# Patient Record
Sex: FEMALE | Race: WHITE
[De-identification: names, ages, dates, MRNs, and addresses within clinical notes are randomized per-mention and may not be internally consistent; named-entity substitution may affect disease eponyms.]

---

## 2020-08-05 ENCOUNTER — HOSPITAL ENCOUNTER (OUTPATIENT)
Dept: HOSPITAL 47 - LABPAT | Age: 65
Discharge: HOME | End: 2020-08-05
Attending: ORTHOPAEDIC SURGERY
Payer: MEDICARE

## 2020-08-05 DIAGNOSIS — Z01.818: Primary | ICD-10-CM

## 2020-08-05 DIAGNOSIS — Z01.812: ICD-10-CM

## 2020-08-05 LAB
ALBUMIN SERPL-MCNC: 4.1 G/DL (ref 3.5–5)
ALP SERPL-CCNC: 136 U/L (ref 38–126)
ALT SERPL-CCNC: 26 U/L (ref 4–34)
ANION GAP SERPL CALC-SCNC: 6 MMOL/L
APTT BLD: 24.1 SEC (ref 22–30)
AST SERPL-CCNC: 33 U/L (ref 14–36)
BUN SERPL-SCNC: 21 MG/DL (ref 7–17)
CALCIUM SPEC-MCNC: 9.7 MG/DL (ref 8.4–10.2)
CHLORIDE SERPL-SCNC: 107 MMOL/L (ref 98–107)
CO2 SERPL-SCNC: 28 MMOL/L (ref 22–30)
ERYTHROCYTE [DISTWIDTH] IN BLOOD BY AUTOMATED COUNT: 5.1 M/UL (ref 3.8–5.4)
ERYTHROCYTE [DISTWIDTH] IN BLOOD: 13.1 % (ref 11.5–15.5)
GLUCOSE SERPL-MCNC: 111 MG/DL (ref 74–99)
HCT VFR BLD AUTO: 46.6 % (ref 34–46)
HGB BLD-MCNC: 14.8 GM/DL (ref 11.4–16)
HYALINE CASTS UR QL AUTO: 8 /LPF (ref 0–2)
INR PPP: 1 (ref ?–1.2)
MCH RBC QN AUTO: 29 PG (ref 25–35)
MCHC RBC AUTO-ENTMCNC: 31.7 G/DL (ref 31–37)
MCV RBC AUTO: 91.4 FL (ref 80–100)
PH UR: 5.5 [PH] (ref 5–8)
PLATELET # BLD AUTO: 259 K/UL (ref 150–450)
POTASSIUM SERPL-SCNC: 4.7 MMOL/L (ref 3.5–5.1)
PROT SERPL-MCNC: 6.8 G/DL (ref 6.3–8.2)
PT BLD: 10 SEC (ref 9–12)
RBC UR QL: 1 /HPF (ref 0–5)
SODIUM SERPL-SCNC: 141 MMOL/L (ref 137–145)
SP GR UR: 1.02 (ref 1–1.03)
SQUAMOUS UR QL AUTO: 4 /HPF (ref 0–4)
UROBILINOGEN UR QL STRIP: <2 MG/DL (ref ?–2)
WBC # BLD AUTO: 7.1 K/UL (ref 3.8–10.6)
WBC #/AREA URNS HPF: 6 /HPF (ref 0–5)

## 2020-08-05 PROCEDURE — 85610 PROTHROMBIN TIME: CPT

## 2020-08-05 PROCEDURE — 87070 CULTURE OTHR SPECIMN AEROBIC: CPT

## 2020-08-05 PROCEDURE — 81001 URINALYSIS AUTO W/SCOPE: CPT

## 2020-08-05 PROCEDURE — 85730 THROMBOPLASTIN TIME PARTIAL: CPT

## 2020-08-05 PROCEDURE — 85027 COMPLETE CBC AUTOMATED: CPT

## 2020-08-05 PROCEDURE — 36415 COLL VENOUS BLD VENIPUNCTURE: CPT

## 2020-08-05 PROCEDURE — 80053 COMPREHEN METABOLIC PANEL: CPT

## 2020-08-18 ENCOUNTER — HOSPITAL ENCOUNTER (OUTPATIENT)
Dept: HOSPITAL 47 - OR | Age: 65
LOS: 1 days | Discharge: HOME HEALTH | End: 2020-08-19
Attending: ORTHOPAEDIC SURGERY
Payer: MEDICARE

## 2020-08-18 VITALS — BODY MASS INDEX: 32.9 KG/M2

## 2020-08-18 DIAGNOSIS — Z80.8: ICD-10-CM

## 2020-08-18 DIAGNOSIS — Z88.0: ICD-10-CM

## 2020-08-18 DIAGNOSIS — F32.9: ICD-10-CM

## 2020-08-18 DIAGNOSIS — Z87.891: ICD-10-CM

## 2020-08-18 DIAGNOSIS — K21.9: ICD-10-CM

## 2020-08-18 DIAGNOSIS — Z79.899: ICD-10-CM

## 2020-08-18 DIAGNOSIS — M06.9: ICD-10-CM

## 2020-08-18 DIAGNOSIS — Z99.89: ICD-10-CM

## 2020-08-18 DIAGNOSIS — Z88.8: ICD-10-CM

## 2020-08-18 DIAGNOSIS — Z79.1: ICD-10-CM

## 2020-08-18 DIAGNOSIS — Z79.891: ICD-10-CM

## 2020-08-18 DIAGNOSIS — Z96.653: ICD-10-CM

## 2020-08-18 DIAGNOSIS — Z90.49: ICD-10-CM

## 2020-08-18 DIAGNOSIS — Z90.89: ICD-10-CM

## 2020-08-18 DIAGNOSIS — Z83.3: ICD-10-CM

## 2020-08-18 DIAGNOSIS — I89.0: ICD-10-CM

## 2020-08-18 DIAGNOSIS — M16.11: Primary | ICD-10-CM

## 2020-08-18 DIAGNOSIS — K22.8: ICD-10-CM

## 2020-08-18 DIAGNOSIS — J30.2: ICD-10-CM

## 2020-08-18 DIAGNOSIS — E66.9: ICD-10-CM

## 2020-08-18 DIAGNOSIS — M79.7: ICD-10-CM

## 2020-08-18 DIAGNOSIS — Z90.710: ICD-10-CM

## 2020-08-18 DIAGNOSIS — Z88.2: ICD-10-CM

## 2020-08-18 DIAGNOSIS — G47.33: ICD-10-CM

## 2020-08-18 DIAGNOSIS — Z97.3: ICD-10-CM

## 2020-08-18 PROCEDURE — 27130 TOTAL HIP ARTHROPLASTY: CPT

## 2020-08-18 PROCEDURE — 97161 PT EVAL LOW COMPLEX 20 MIN: CPT

## 2020-08-18 PROCEDURE — 86900 BLOOD TYPING SEROLOGIC ABO: CPT

## 2020-08-18 PROCEDURE — 88300 SURGICAL PATH GROSS: CPT

## 2020-08-18 PROCEDURE — 73501 X-RAY EXAM HIP UNI 1 VIEW: CPT

## 2020-08-18 PROCEDURE — 85025 COMPLETE CBC W/AUTO DIFF WBC: CPT

## 2020-08-18 PROCEDURE — 86901 BLOOD TYPING SEROLOGIC RH(D): CPT

## 2020-08-18 PROCEDURE — 97165 OT EVAL LOW COMPLEX 30 MIN: CPT

## 2020-08-18 PROCEDURE — 86850 RBC ANTIBODY SCREEN: CPT

## 2020-08-18 PROCEDURE — 86891 AUTOLOGOUS BLOOD OP SALVAGE: CPT

## 2020-08-18 RX ADMIN — HYDROCODONE BITARTRATE AND ACETAMINOPHEN PRN EACH: 5; 325 TABLET ORAL at 15:47

## 2020-08-18 RX ADMIN — GABAPENTIN SCH MG: 300 CAPSULE ORAL at 21:38

## 2020-08-18 RX ADMIN — ASPIRIN 325 MG ORAL TABLET SCH MG: 325 PILL ORAL at 20:37

## 2020-08-18 RX ADMIN — HYDROCODONE BITARTRATE AND ACETAMINOPHEN PRN EACH: 5; 325 TABLET ORAL at 21:42

## 2020-08-18 RX ADMIN — SODIUM CHLORIDE ONE MG: 9 INJECTION, SOLUTION INTRAVENOUS at 14:07

## 2020-08-18 RX ADMIN — CEFAZOLIN SCH: 330 INJECTION, POWDER, FOR SOLUTION INTRAMUSCULAR; INTRAVENOUS at 15:56

## 2020-08-18 RX ADMIN — POTASSIUM CHLORIDE SCH MLS: 14.9 INJECTION, SOLUTION INTRAVENOUS at 11:56

## 2020-08-18 RX ADMIN — SODIUM CHLORIDE ONE MG: 9 INJECTION, SOLUTION INTRAVENOUS at 13:29

## 2020-08-18 NOTE — FL
EXAMINATION TYPE: FL guidance operating room, XR Hip Limited RT

 

DATE OF EXAM: 8/18/2020

 

CLINICAL HISTORY: Right hip pain and osteoarthritis.

 

TECHNIQUE: Fluoroscopy. Intraoperative limited views right hip.

 

COMPARISON:  None.

 

FINDINGS:  Fluoroscopic guidance was provided during right hip replacement procedure performed by Dr. Bucio.  A total of 63 seconds of fluoroscopic time was utilized during the procedure and 3 spot f
luoroscopic intraoperative images are acquired.

 

Intraoperative images obtained show metallic artifact from total right hip arthroplasty satisfactory 
in position on frontal view with surrounding lucency or gas from recent surgical replacement noted.

 

IMPRESSION:  As Above.

## 2020-08-18 NOTE — XR
EXAMINATION TYPE: XR Hip Limited RT

 

DATE OF EXAM: 8/18/2020

 

CLINICAL HISTORY: Right hip pain and osteoarthritis.

 

TECHNIQUE: Single AP portable view of right hip is obtained immediately postoperatively.  

 

COMPARISON: None.

 

FINDINGS: Metallic hardware from right hip arthroplasty is seen and appears satisfactory in alignment
 and position.  There is evidence of recent surgery with subcutaneous gas noted surrounding prosthesi
s.  

 

IMPRESSION: Metallic hardware from right hip arthroplasty is satisfactory in position.

## 2020-08-18 NOTE — P.OP
Date of Procedure: 08/18/20


Preoperative Diagnosis: 


Severe osteoarthritis right hip


Postoperative Diagnosis: 


Severe osteoarthritis right hip


Procedure(s) Performed: 


Right total hip arthroplasty with a direct anterior approach


Implants: 


Smith and nephew Polarstem size 3 standard


Smith & Nephew R3, 3 hole acetabular shell, 54 mm


Smith & Nephew reflection 6.5 mm cancellus screw, 20 mm 2


Smith & Nephew R3, XLPE 20 acetabular liner


Smith & Nephew Oxinium femoral head 36 m, +4


All components were press-fit.


The articulation is Oxinium on polyethylene.


Anesthesia: spinal


Surgeon: Miki Bucio


Assistant #1: Penelope Vazquez


Estimated Blood Loss (ml): 300 (124 mL returned with Cell Saver)


Pathology: other (Femoral head)


Condition: stable


Disposition: PACU


Indications for Procedure: 


After failure of conservative treatment we discussed the surgical and 

nonsurgical treatment options at length.  Patient wishes to proceed with a total

hip arthroplasty with a direct anterior approach.  Complications specific to 

this procedure were discussed at length, including but not limited to infection,

leg length discrepancy, dislocation, and nerve injury.  Covid-19 was also 

discussed at length with the patient, and they are aware of the current policies

and procedures.  The patient was given the option of delaying surgery, but they 

elect to proceed knowing these risks.  Patient is aware of all these 

complications and informed consent was obtained


Operative Findings: 


The operative findings are consistent with severe osteoarthritis of the right 

hip


Description of Procedure: 


Patient was seen and evaluated in the preoperative area, consent was reviewed, 

and the surgical site was marked with a skin marker.  Patient was then brought 

to the operating room and given prophylactic antibiotics intravenously.  1 g of 

Tranexamic acid was also given.  A spinal anesthetic was administered by the 

anesthesia department.   The patient was then placed on the Donna table with the 

bony prominences well-padded.  The hip area was then prepped and draped in usual

sterile fashion.





A universal timeout was then performed, which confirmed the patient's name, 

surgical site, ALLERGIES, and procedure being performed.  Next the incision site

was located at 1 cm distal and 1 cm lateral to the anterior superior iliac 

spine.  The skin and subcutaneous tissues were sharply incised.  Incision was 

carefully dissected down to the fascia overlying the tensor fascia xena muscle. 

This fascia was then incised in line with the incision.  Next, using blunt 

finger dissection, the tensor fascia xena muscle was dissected off its investing

fascia.  The muscle was then carefully retracted laterally with a cobra 

retractor over the lateral neck of the femur.  Next, the circumflex vessels were

identified and cauterized using the AquaMantis device.  The anterior hip capsule

was then exposed.  The capsule was then opened and an inverted T fashion.  Cobra

retractors were then placed intracapsularly.  The proximal femur was then 

visualized.





The femoral neck was then osteotomized appropriate level above the lesser 

trochanter.  Small amount of traction was placed with the Donna table.  A small 

wedge of bone was then removed from the remaining femoral head.  Next, using a 

corkscrew femoral head was easily removed from the acetabulum.  On gross visual 

inspection, the femoral head had complete loss of articular cartilage in 

multiple periarticular osteophytes.  Attention was then turned to the 

acetabulum.





the acetabulum was exposed and any remaining labrum was excised.  Sequential 

reaming of the acetabulum was performed using fluoroscopic guidance.  When the 

appropriate size was reached, a trial was then placed.  The position and fit of 

the trial was checked with fluoroscopy.  The trial was then removed.  Then, 

using fluoroscopic guidance, the final implant was impacted at 20 of 

anteversion and 40 of abduction, and fully seated in the acetabulum.  2 screws 

were then placed in the acetabulum.  Again fluoroscopy was used to check 

position of the screws.  Next, the liner was then impacted, with a 20 elevated 

liner located in the anterior superior quadrant.  Component locking was 

confirmed.





Attention was then directed to the femur.  With the aid of the Donna table, the 

femur was externally rotated to approximately 130, extended, and abducted under

the opposite leg.  A side hook was then placed under the proximal femur, and the

side hook elevator was used to elevate the proximal femur.  Retractors were then

placed.  A capsular release was performed, as well as a release of the conjoined

tendon, which afforded excellent visualization of the proximal femur.  Next, a 

box osteotome was used to lateralize the proximal femur.  A  was then

used to locate the femoral canal.  Sequential broaching was then performed with 

appropriate size which afforded excellent fixation in the proximal femur.  A 

trial was then placed with appropriate head and neck, and the hip was gently 

reduced with the aid of the Donna table.  Fluoroscopy was then used to check 

position of the components, as well as to ensure equal leg lengths.  The hip was

then gently dislocated and the trials were then removed.  Final implants were 

then impacted and the hip was again reduced.  Final fluoroscopic x-rays 

confirmed that the components were in anatomic position, as well as equal leg 

lengths.  The hip was also taken through range of motion, and found to be 

stable.





The hip was then copiously irrigated with antibiotic solution with pulsatile 

lavage.  The hip was then irrigated with Irrisept solution.  The soft tissues 

were then injected with a ropivacaine solution, which consisted of 246.25 mg of 

ropivacaine, 0.5 mg of epinephrine, 30 mg of Toradol, 80 g of clonidine, and 

48.45 mL of sterile water, for a total of 100 mL of fluid injected.  A second 

dose of 1 g of Tranexamic acid was also given.





the fascia was then closed with 2-0 strata fix suture.  The subcutaneous tissue 

was closed with 3-0 Vicryl.  The subcuticular tissue was closed with 3-0 strata 

fix suture.  The skin was then closed with Dermabond glue and a sterile silver 

dressing.  The patient was then transferred to the recovery room in stable 

condition.





The assistant  CHANDRAKANT Henriquez was required due to the complexity of surgery, 

and the need for skilled surgical assistant for positioning, draping, exposure, 

retraction, and closure of the wound.

## 2020-08-19 VITALS
TEMPERATURE: 98.1 F | HEART RATE: 83 BPM | SYSTOLIC BLOOD PRESSURE: 136 MMHG | RESPIRATION RATE: 18 BRPM | DIASTOLIC BLOOD PRESSURE: 71 MMHG

## 2020-08-19 LAB
BASOPHILS # BLD AUTO: 0 K/UL (ref 0–0.2)
BASOPHILS NFR BLD AUTO: 0 %
EOSINOPHIL # BLD AUTO: 0 K/UL (ref 0–0.7)
EOSINOPHIL NFR BLD AUTO: 0 %
ERYTHROCYTE [DISTWIDTH] IN BLOOD BY AUTOMATED COUNT: 3.97 M/UL (ref 3.8–5.4)
ERYTHROCYTE [DISTWIDTH] IN BLOOD: 13.2 % (ref 11.5–15.5)
HCT VFR BLD AUTO: 36.1 % (ref 34–46)
HGB BLD-MCNC: 11.7 GM/DL (ref 11.4–16)
LYMPHOCYTES # SPEC AUTO: 0.9 K/UL (ref 1–4.8)
LYMPHOCYTES NFR SPEC AUTO: 10 %
MCH RBC QN AUTO: 29.4 PG (ref 25–35)
MCHC RBC AUTO-ENTMCNC: 32.4 G/DL (ref 31–37)
MCV RBC AUTO: 90.7 FL (ref 80–100)
MONOCYTES # BLD AUTO: 0.7 K/UL (ref 0–1)
MONOCYTES NFR BLD AUTO: 7 %
NEUTROPHILS # BLD AUTO: 8 K/UL (ref 1.3–7.7)
NEUTROPHILS NFR BLD AUTO: 82 %
PLATELET # BLD AUTO: 210 K/UL (ref 150–450)
WBC # BLD AUTO: 9.8 K/UL (ref 3.8–10.6)

## 2020-08-19 RX ADMIN — CEFAZOLIN SCH: 330 INJECTION, POWDER, FOR SOLUTION INTRAMUSCULAR; INTRAVENOUS at 08:05

## 2020-08-19 RX ADMIN — POTASSIUM CHLORIDE SCH: 14.9 INJECTION, SOLUTION INTRAVENOUS at 04:47

## 2020-08-19 RX ADMIN — GABAPENTIN SCH MG: 300 CAPSULE ORAL at 08:04

## 2020-08-19 RX ADMIN — ASPIRIN 325 MG ORAL TABLET SCH MG: 325 PILL ORAL at 08:04

## 2020-08-19 RX ADMIN — CEFAZOLIN SCH: 330 INJECTION, POWDER, FOR SOLUTION INTRAMUSCULAR; INTRAVENOUS at 03:59

## 2020-08-19 NOTE — P.CONS
History of Present Illness





- History of Present Illness





This is a pleasant 65 years old female with past medical history of osteoarth

ritis, rheumatoid arthritis, GERD, fibromyalgia.  She was admitted for severe 

osteoarthritis of right hip, status post elective right total hip arthroplasty. 

Today postop day #1


Medical consult was requested for medical management.  Patient denies any 

specific symptoms, she was pleasant and smiling.  She denies chest pain or 

dyspnea, no abdominal pain, no nausea, vomiting, showed bowel movement.  She 

denies fever.


vitals reviewed











Review of Systems





CONSTITUTIONAL: No fever, no malaise, no fatigue. 


HEENT: No recent visual problems or hearing problems. Denied any sore throat. 


CARDIOVASCULAR: No  orthopnea, PND, no palpitations, no syncope. 


PULMONARY: No shortness of breath, no cough, no hemoptysis. 


GASTROINTESTINAL: No diarrhea, no nausea, no vomiting, no abdominal pain. 

Normoactive bowel sounds. 


NEUROLOGICAL: No headaches, no weakness, no numbness. 


HEMATOLOGICAL: Denies any bleeding or petechiae. 


GENITOURINARY: Denies any burning micturition, frequency, or urgency. 


MUSCULOSKELETAL/RHEUMATOLOGICAL: Denies any joint pain, swelling, or any muscle 

pain. 








Past Medical History


Past Medical History: Fibromyalgia, GERD/Reflux, Osteoarthritis (OA), Rheumatoid

Arthritis (RA), Sleep Apnea/CPAP/BIPAP


Additional Past Medical History / Comment(s): seasonal allergies, has 

cpap,lymphdema rt arm


History of Any Multi-Drug Resistant Organisms: None Reported


Past Surgical History: Cholecystectomy, Hysterectomy, Joint Replacement, 

Orthopedic Surgery, Tonsillectomy


Additional Past Surgical History / Comment(s): 2 large lipomas removed rt 

axillae, 3 total knee lt x1,rt knee x2 replacement,lipoma removed from back,keith 

carpal tunnel,tendon release middle finger and thumbs keith., keith thumb joints


Past Anesthesia/Blood Transfusion Reactions: No Reported Reaction


Past Psychological History: Depression


Smoking Status: Former smoker


Past Alcohol Use History: Rare


Additional Past Alcohol Use History / Comment(s): smoked 10 years off and on 1 

ppd, quit age 27


Past Drug Use History: None Reported





- Past Family History


  ** Sister(s)


Family Medical History: Cancer


Additional Family Medical History / Comment(s): skin cancer





  ** Brother(s)


Family Medical History: Cancer


Additional Family Medical History / Comment(s): skin cancer





Medications and Allergies


                                Home Medications











 Medication  Instructions  Recorded  Confirmed  Type


 


Acetaminophen [Tylenol] 325 mg PO DAILY PRN 08/12/20 08/18/20 History


 


Albuterol Inhaler [Ventolin Hfa 1 puff INHALATION DAILY PRN 08/12/20 08/18/20 

History





Inhaler]    


 


Esomeprazole Magnesium [NexIUM] 20 mg PO DAILY PRN 08/12/20 08/18/20 History


 


Fluticasone Nasal Spray [Flonase 1 spray EA NOSTRIL DAILY PRN 08/12/20 08/18/20 

History





Nasal Spray]    


 


Gabapentin [Neurontin] 600 mg PO TID 08/12/20 08/18/20 History


 


Leflunomide [Arava] 20 mg PO Q48H 08/12/20 08/18/20 History


 


Loratadine-Pseudoeph 5-120 mg 1 tab PO Q12HR PRN 08/12/20 08/18/20 History





[Claritin-D 12 Hour]    


 


Meloxicam [Mobic] 7.5 mg PO BID 08/12/20 08/18/20 History


 


Sertraline HCl [Zoloft] 100 mg PO DAILY 08/12/20 08/18/20 History


 


Acetaminophen-Codeine 300-30mg 1 - 2 tab PO Q6H PRN #56 tablet 08/19/20  Rx





[Tylenol #3]    


 


Aspirin 325 mg PO BID #60 tab 08/19/20  Rx


 


Sennosides [Senokot] 2 tab PO DAILY PRN #60 tablet 08/19/20  Rx








                                    Allergies











Allergy/AdvReac Type Severity Reaction Status Date / Time


 


amoxicillin [From Augmentin] Allergy  Nausea Verified 08/18/20 11:38


 


aripiprazole [From Abilify] Allergy  Unknown Verified 08/18/20 11:38


 


clavulanic acid Allergy  Nausea Verified 08/18/20 11:38





[From Augmentin]     


 


methotrexate Allergy  ringing in Verified 08/18/20 11:38





   ears  


 


Sulfa (Sulfonamide Allergy  Rash/Hives, Verified 08/18/20 11:38





Antibiotics)   itchy  














Physical Exam


Vitals: 


                                   Vital Signs











  Temp Pulse Pulse Resp BP BP Pulse Ox


 


 08/19/20 08:00    83  18   


 


 08/19/20 07:00  98.1 F   83  18  136/71   96


 


 08/19/20 03:02     14   


 


 08/19/20 01:23  97.5 F L   71  20  113/67   92 L


 


 08/18/20 23:29     16   


 


 08/18/20 19:30  97.6 F   92  20  145/54   93 L


 


 08/18/20 17:08   77     120/74 


 


 08/18/20 16:54   83     154/69 


 


 08/18/20 16:38   75     122/77 


 


 08/18/20 16:23   77     135/83 


 


 08/18/20 16:08   83     135/72 


 


 08/18/20 16:00   78   16   


 


 08/18/20 15:53   78     123/81 


 


 08/18/20 15:39   80     141/70 


 


 08/18/20 15:38  97.6 F  76   14   136/78  94 L


 


 08/18/20 15:17   78   16   152/72  97


 


 08/18/20 15:00   80   16   157/75  98


 


 08/18/20 14:47   87   16   165/78  92 L


 


 08/18/20 14:35  97.7 F  84   16   144/80  95








                                Intake and Output











 08/18/20 08/19/20 08/19/20





 22:59 06:59 14:59


 


Intake Total 710 560 


 


Balance 710 560 


 


Intake:   


 


    


 


  Intake, IV Titration 560 560 





  Amount   


 


    Lactated Ringers 1,000 ml  560 





    @ 20 mls/hr IV .Q24H [facility]   





    Rx#:[id number]   


 


    Sodium Chloride 0.9% 1, 560  





    000 ml @ 70 mls/hr IV .   





    [id number] [id number] Rx#:[id number]   


 


Other:   


 


  Voiding Method Toilet  Toilet


 


  # Voids 2 1 


 


  Weight 107.5 kg  

















GENERAL: The patient is alert and oriented x3, not in any acute distress. Well 

developed, well nourished. 


HEENT: Pupils are round and equally reacting to light. EOMI. No scleral icterus.

No conjunctival pallor. Normocephalic, atraumatic. No pharyngeal erythema. No 

thyromegaly. 


CARDIOVASCULAR: S1 and S2 present. No murmurs, rubs, or gallops. 


PULMONARY: Chest is clear to auscultation, no wheezing or crackles. 


ABDOMEN: Soft, nontender, nondistended, normoactive bowel sounds. No palpable 

organomegaly. 


MUSCULOSKELETAL: No joint swelling or deformity. 


EXTREMITIES: No cyanosis, clubbing, or pedal edema. 


NEUROLOGICAL: Gross neurological examination did not reveal any focal deficits. 


SKIN: No rashes. No petechiae








Results


CBC & Chem 7: 


                                 08/19/20 06:31





Labs: 


                  Abnormal Lab Results - Last 24 Hours (Table)











  08/19/20 Range/Units





  06:31 


 


Neutrophils #  8.0 H  (1.3-7.7)  k/uL


 


Lymphocytes #  0.9 L  (1.0-4.8)  k/uL














Assessment and Plan


Assessment: 








Severe osteoarthritis of right hip status post right total hip arthroplasty


Rheumatoid arthritis


GERD


Fibroma lodging


Osteoarthritis








Plan: 





This is a pleasant 65 years old female who presents with total right hip 

arthroplasty.


Labs and medication were reviewed..  Continue same treatment.  Continue with sy

mptomatic treatment.  Resume home medication.  Monitor lytes and vitals.  DVT 

and GI prophylaxis.  Further recommendations of the clinical course of the 

patient


DVT prophylaxis and pain management as per surgery primary team.  





We Recommend patient follow up with her PCP within one week after discharge and 

patient was instructed with the same











Thank you for consulting us

## 2020-08-19 NOTE — P.DS
Providers


Expected date of discharge: 08/19/20


Attending physician: 


Miki Bucio





Consults: 





                                        





08/18/20 12:22


Consult Physician Routine 


   Consulting Provider: Jun Martinez


   Consult Reason/Comments: medical management


   Do you want consulting provider notified?: Yes











Primary care physician: 


Tracy Linn








- Discharge Diagnosis(es)


(1) S/P total hip arthroplasty


Current Visit: Yes   Status: Acute   





(2) Osteoarthritis of right hip


Current Visit: Yes   Status: Acute   


Hospital Course: 


This is a 65-year-old female with known history of degenerative arthritis of the

right hip.  The patient presents for evaluation.  After discussion and 

consideration patient elects to proceed with total hip arthroplasty.  The 

patient is seen preoperatively by Dr. Bucio and medically cleared for surgery

by their primary care physician.





Patient is admitted to [facility] on 08/18/2020 for total hip 

arthroplasty.  The procedures performed without complication or sequelae.  The 

patient is doing well postoperatively.  Labs and vital signs are stable on day 

of discharge.





On day of discharge patient's hip incision is healing well.  There is minimal 

erythema.  There is no drainage noted at this time.  There is minimal soft 

tissue swelling to the hip and thigh.  Patient has full foot and ankle motion 

without difficulty or pain.  Calf is soft and nontender to palpation.  

Neurovascular status to the right lower extremity is intact.  Patient is 

discharged home in good condition. Opioid start talking form is reviewed and 

signed at patient bedside.  Please see med rec for accurate list of home 

medications.








Plan - Discharge Summary


Discharge Rx Participant: No


New Discharge Prescriptions: 


New


   Aspirin 325 mg PO BID #60 tab


   Sennosides [Senokot] 2 tab PO DAILY PRN #60 tablet


     PRN Reason: Constipation


   Acetaminophen-Codeine 300-30mg [Tylenol #3] 1 - 2 tab PO Q6H PRN #56 tablet


     PRN Reason: Pain





No Action


   Loratadine-Pseudoeph 5-120 mg [Claritin-D 12 Hour] 1 tab PO Q12HR PRN


     PRN Reason: allergies


   Fluticasone Nasal Spray [Flonase Nasal Spray] 1 spray EA NOSTRIL DAILY PRN


     PRN Reason: allergies


   Albuterol Inhaler [Ventolin Hfa Inhaler] 1 puff INHALATION DAILY PRN


     PRN Reason: Dyspnea


   Meloxicam [Mobic] 7.5 mg PO BID


   Leflunomide [Arava] 20 mg PO Q48H


   Sertraline HCl [Zoloft] 100 mg PO DAILY


   Gabapentin [Neurontin] 600 mg PO TID


   Acetaminophen [Tylenol] 325 mg PO DAILY PRN


     PRN Reason: Pain


   Esomeprazole Magnesium [NexIUM] 20 mg PO DAILY PRN


     PRN Reason: acid reflux


Discharge Medication List





Acetaminophen [Tylenol] 325 mg PO DAILY PRN 08/12/20 [History]


Albuterol Inhaler [Ventolin Hfa Inhaler] 1 puff INHALATION DAILY PRN 08/12/20 

[History]


Esomeprazole Magnesium [NexIUM] 20 mg PO DAILY PRN 08/12/20 [History]


Fluticasone Nasal Spray [Flonase Nasal Spray] 1 spray EA NOSTRIL DAILY PRN 

08/12/20 [History]


Gabapentin [Neurontin] 600 mg PO TID 08/12/20 [History]


Leflunomide [Arava] 20 mg PO Q48H 08/12/20 [History]


Loratadine-Pseudoeph 5-120 mg [Claritin-D 12 Hour] 1 tab PO Q12HR PRN 08/12/20 

[History]


Meloxicam [Mobic] 7.5 mg PO BID 08/12/20 [History]


Sertraline HCl [Zoloft] 100 mg PO DAILY 08/12/20 [History]


Acetaminophen-Codeine 300-30mg [Tylenol #3] 1 - 2 tab PO Q6H PRN #56 tablet 

08/19/20 [Rx]


Aspirin 325 mg PO BID #60 tab 08/19/20 [Rx]


Sennosides [Senokot] 2 tab PO DAILY PRN #60 tablet 08/19/20 [Rx]








Follow up Appointment(s)/Referral(s): 


Miki Bucio DO [Doctor of Osteopathic Medicine] - 2 Weeks


Activity/Diet/Wound Care/Special Instructions: 


Weightbearing as tolerated with walker.


Leave dressing intact.  Dressing may be removed by home care nurse or by patient

in 10 days.


May shower with dressing on.


Recommend use of compression stockings daily until follow up to help prevent 

swelling and blood clots. May remove at night before sleeping. 


Please follow-up with [facility] in 2 weeks and call with any 

questions or concerns, [phone].


Discharge Disposition: HOME WITH HOME HEALTH SERVICES

## 2022-08-02 ENCOUNTER — HOSPITAL ENCOUNTER (INPATIENT)
Dept: HOSPITAL 47 - EC | Age: 67
LOS: 3 days | Discharge: HOME | DRG: 481 | End: 2022-08-05
Attending: ORTHOPAEDIC SURGERY | Admitting: ORTHOPAEDIC SURGERY
Payer: MEDICARE

## 2022-08-02 DIAGNOSIS — J40: ICD-10-CM

## 2022-08-02 DIAGNOSIS — M79.7: ICD-10-CM
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
## 2022-08-02 DIAGNOSIS — Z88.0: ICD-10-CM
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
## 2022-08-02 DIAGNOSIS — M06.9: ICD-10-CM

## 2022-08-02 DIAGNOSIS — I10: ICD-10-CM

## 2022-08-02 DIAGNOSIS — K21.9: ICD-10-CM

## 2022-08-02 DIAGNOSIS — Z79.899: ICD-10-CM

## 2022-08-02 DIAGNOSIS — Z79.1: ICD-10-CM

## 2022-08-02 DIAGNOSIS — Z96.653: ICD-10-CM

## 2022-08-02 DIAGNOSIS — Z88.1: ICD-10-CM

## 2022-08-02 DIAGNOSIS — Z87.891: ICD-10-CM

## 2022-08-02 DIAGNOSIS — Z88.2: ICD-10-CM

## 2022-08-02 DIAGNOSIS — Z96.641: ICD-10-CM

## 2022-08-02 DIAGNOSIS — S52.502A: ICD-10-CM
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
## 2022-08-02 DIAGNOSIS — M16.11: ICD-10-CM

## 2022-08-02 DIAGNOSIS — T84.020A: Primary | ICD-10-CM

## 2022-08-02 DIAGNOSIS — E78.5: ICD-10-CM

## 2022-08-02 DIAGNOSIS — Z82.49: ICD-10-CM

## 2022-08-02 DIAGNOSIS — F32.A: ICD-10-CM

## 2022-08-02 DIAGNOSIS — Z79.82: ICD-10-CM

## 2022-08-02 DIAGNOSIS — Z80.8: ICD-10-CM

## 2022-08-02 PROCEDURE — 96376 TX/PRO/DX INJ SAME DRUG ADON: CPT

## 2022-08-02 PROCEDURE — 80048 BASIC METABOLIC PNL TOTAL CA: CPT

## 2022-08-02 PROCEDURE — 99285 EMERGENCY DEPT VISIT HI MDM: CPT

## 2022-08-02 PROCEDURE — 96374 THER/PROPH/DIAG INJ IV PUSH: CPT

## 2022-08-02 PROCEDURE — 85025 COMPLETE CBC W/AUTO DIFF WBC: CPT

## 2022-08-02 PROCEDURE — 94760 N-INVAS EAR/PLS OXIMETRY 1: CPT

## 2022-08-02 PROCEDURE — 73501 X-RAY EXAM HIP UNI 1 VIEW: CPT

## 2022-08-02 PROCEDURE — 94640 AIRWAY INHALATION TREATMENT: CPT

## 2022-08-02 NOTE — ED
General Adult HPI





- General


Chief complaint: Fall


Stated complaint: Fall, hip injury


Time Seen by Provider: 22 19:50


Source: patient


Mode of arrival: EMS


Limitations: physical limitation





- History of Present Illness


Initial comments: 





67-year-old female with past medical history of right hip replacement in  

presents emergency room from [facility] in [facility].  Earlier today 

the patient was standing in her kitchen when she went to turn left and 

subsequently felt severe pain in her right hip causing her fall to the floor.  

At outside hospital patient went sedation and reduction 2 by the ER physician 

and on-call orthopedist.  They were unable to reduce the patient's right hip and

therefore contacted Dr. Smith who did originally procedure.  Patient was 

agreeable to transfer to our facility for possible open reduction.  She arrives 

and continues to have pain.  Last received morphine 2 mg around 5:30.  No 

numbness or tingling in the foot.  No other injuries.  No other alleviating, 

precipitating or modifying factors





- Related Data


                                Home Medications











 Medication  Instructions  Recorded  Confirmed


 


Albuterol Inhaler [Ventolin Hfa 2 puff INHALATION RT-Q6H PRN 20





Inhaler]   


 


Fluticasone Nasal Spray [Flonase 1 spray EA NOSTRIL DAILY PRN 20





Nasal Spray]   


 


Leflunomide [Arava] 20 mg PO Q48H 20


 


Sertraline HCl [Zoloft] 100 mg PO DAILY 20


 


Cholecalciferol [Vitamin D3 (25 25 mcg PO TID 22





Mcg = 1000 Iu)]   


 


Esomeprazole Magnesium [NexIUM 20 mg PO DAILY 22





24Hr]   


 


Pregabalin [Lyrica] 150 mg PO TID 22








                                  Previous Rx's











 Medication  Instructions  Recorded


 


Aspirin 325 mg PO BID #60 tab 22


 


HYDROcodone/APAP 5-325MG [Norco 5] 1 each PO Q6HR PRN #28 tab 22


 


Ondansetron [Zofran] 4 mg PO Q6HR PRN #30 tab 22


 


Sennosides-Docusate Sodium 1 tab PO BID PRN #60 tablet 22





[Senokot-S]  











                                    Allergies











Allergy/AdvReac Type Severity Reaction Status Date / Time


 


amoxicillin [From Augmentin] Allergy  Nausea Verified 22 17:27


 


aripiprazole [From Abilify] Allergy  Unknown Verified 22 17:27


 


clavulanic acid Allergy  Nausea Verified 22 17:27





[From Augmentin]     


 


levofloxacin [From Levaquin] Allergy  Unknown Verified 22 17:27


 


methotrexate Allergy  ringing in Verified 22 17:27





   ears  


 


Sulfa (Sulfonamide Allergy  Rash/Hives, Verified 22 17:27





Antibiotics)   itchy  














Review of Systems


ROS Statement: 


Those systems with pertinent positive or pertinent negative responses have been 

documented in the HPI.





ROS Other: All systems not noted in ROS Statement are negative.





Past Medical History


Past Medical History: Fibromyalgia, GERD/Reflux, Osteoarthritis (OA), Rheumatoid

Arthritis (RA), Sleep Apnea/CPAP/BIPAP


Additional Past Medical History / Comment(s): seasonal allergies, has 

cpap,lymphdema rt arm


History of Any Multi-Drug Resistant Organisms: None Reported


Past Surgical History: Cholecystectomy, Hysterectomy, Joint Replacement, 

Orthopedic Surgery, Tonsillectomy


Additional Past Surgical History / Comment(s): 2 large lipomas removed rt a

xillae, 3 total knee lt x1,rt knee x2 replacement,lipoma removed from back,keith 

carpal tunnel,tendon release middle finger and thumbs keith., keith thumb joints


Past Anesthesia/Blood Transfusion Reactions: No Reported Reaction


Past Psychological History: Depression


Smoking Status: Former smoker


Past Alcohol Use History: Rare


Past Drug Use History: None Reported





- Past Family History


  ** Sister(s)


Family Medical History: Cancer


Additional Family Medical History / Comment(s): skin cancer





  ** Brother(s)


Family Medical History: Cancer


Additional Family Medical History / Comment(s): skin cancer





  ** Father


Family Medical History: Myocardial Infarction (MI)


Additional Family Medical History / Comment(s): Father  of a MI at the age 

of 65yrs.





  ** Mother


Family Medical History: No Reported History


Additional Family Medical History / Comment(s): Mother was healthy and lived to 

be 96yrs old.





General Exam


Limitations: physical limitation


General appearance: alert, in no apparent distress


Head exam: Present: atraumatic, normocephalic, normal inspection


Eye exam: Present: normal appearance, PERRL, EOMI.  Absent: scleral icterus, 

conjunctival injection, periorbital swelling


ENT exam: Present: normal exam, mucous membranes moist


Neck exam: Present: normal inspection.  Absent: tenderness, meningismus, 

lymphadenopathy


Respiratory exam: Present: normal lung sounds bilaterally.  Absent: respiratory 

distress, wheezes, rales, rhonchi, stridor


Cardiovascular Exam: Present: regular rate, normal rhythm, normal heart sounds. 

Absent: systolic murmur, diastolic murmur, rubs, gallop, clicks


GI/Abdominal exam: Present: soft, normal bowel sounds.  Absent: distended, 

tenderness, guarding, rebound, rigid


Extremities exam: Present: tenderness (right hip. right leg is shortened and 

rotated).  Absent: pedal edema, joint swelling, calf tenderness


Back exam: Present: normal inspection


Neurological exam: Present: alert, oriented X3, CN II-XII intact


Psychiatric exam: Present: normal affect, normal mood


Skin exam: Present: warm, dry, intact, normal color.  Absent: rash





Course


                                   Vital Signs











  22





  19:41 19:42 19:50


 


Temperature 98.7 F  


 


Pulse Rate 79  84


 


Pulse Rate [   





Bilateral   





Supine Dorsalis   





Pedis]   


 


Respiratory 16  64 H





Rate   


 


Blood Pressure 171/94  171/94


 


Blood Pressure   





[Left Arm]   


 


O2 Sat by Pulse 98 87 L 99





Oximetry   














  22





  19:57 20:00 20:10


 


Temperature   


 


Pulse Rate  84 92


 


Pulse Rate [ 77  





Bilateral   





Supine Dorsalis   





Pedis]   


 


Respiratory  15 17





Rate   


 


Blood Pressure  171/94 176/136


 


Blood Pressure   





[Left Arm]   


 


O2 Sat by Pulse  93 L 





Oximetry   














  22





  20:20 20:30 20:33


 


Temperature   


 


Pulse Rate 91 96 82


 


Pulse Rate [   





Bilateral   





Supine Dorsalis   





Pedis]   


 


Respiratory 14 23 16





Rate   


 


Blood Pressure 176/136 176/136 176/136


 


Blood Pressure   





[Left Arm]   


 


O2 Sat by Pulse 94 L 92 L 95





Oximetry   














  22





  20:40 20:50 21:00


 


Temperature   


 


Pulse Rate 87 90 89


 


Pulse Rate [   





Bilateral   





Supine Dorsalis   





Pedis]   


 


Respiratory 15 15 14





Rate   


 


Blood Pressure 176/136 176/136 176/136


 


Blood Pressure   





[Left Arm]   


 


O2 Sat by Pulse 87 L 87 L 94 L





Oximetry   














  22





  21:10 21:20 21:30


 


Temperature   


 


Pulse Rate 90 93 95


 


Pulse Rate [   





Bilateral   





Supine Dorsalis   





Pedis]   


 


Respiratory 16 16 15





Rate   


 


Blood Pressure 176/79 176/79 176/79


 


Blood Pressure   





[Left Arm]   


 


O2 Sat by Pulse 96 97 96





Oximetry   














  22





  21:40 21:50 22:00


 


Temperature   


 


Pulse Rate 97 96 96


 


Pulse Rate [   





Bilateral   





Supine Dorsalis   





Pedis]   


 


Respiratory 12 16 16





Rate   


 


Blood Pressure 176/79 176/79 176/79


 


Blood Pressure   





[Left Arm]   


 


O2 Sat by Pulse 95 94 L 94 L





Oximetry   














  22





  22:10 22:20 22:30


 


Temperature   


 


Pulse Rate 99 101 H 100


 


Pulse Rate [   





Bilateral   





Supine Dorsalis   





Pedis]   


 


Respiratory 16 12 12





Rate   


 


Blood Pressure 153/80 153/80 153/80


 


Blood Pressure   





[Left Arm]   


 


O2 Sat by Pulse 93 L 92 L 92 L





Oximetry   














  22





  22:40 22:50 23:10


 


Temperature   


 


Pulse Rate 98 99 88


 


Pulse Rate [   





Bilateral   





Supine Dorsalis   





Pedis]   


 


Respiratory 13 18 15





Rate   


 


Blood Pressure 153/80 153/80 134/62


 


Blood Pressure   





[Left Arm]   


 


O2 Sat by Pulse 92 L 93 L 94 L





Oximetry   














  22





  23:20 23:30 23:40


 


Temperature   


 


Pulse Rate 89 92 92


 


Pulse Rate [   





Bilateral   





Supine Dorsalis   





Pedis]   


 


Respiratory 14 15 16





Rate   


 


Blood Pressure 134/62 134/62 134/62


 


Blood Pressure   





[Left Arm]   


 


O2 Sat by Pulse 94 L 93 L 93 L





Oximetry   














  22





  23:50 02:21 06:41


 


Temperature   


 


Pulse Rate 92 89 77


 


Pulse Rate [   





Bilateral   





Supine Dorsalis   





Pedis]   


 


Respiratory 10 L 16 18





Rate   


 


Blood Pressure 134/62 133/66 129/62


 


Blood Pressure   





[Left Arm]   


 


O2 Sat by Pulse 94 L 93 L 94 L





Oximetry   














  22





  09:00 14:37


 


Temperature  97.8 F


 


Pulse Rate 87 


 


Pulse Rate [  76





Bilateral  





Supine Dorsalis  





Pedis]  


 


Respiratory 16 18





Rate  


 


Blood Pressure 148/64 


 


Blood Pressure  132/77





[Left Arm]  


 


O2 Sat by Pulse 99 93 L





Oximetry  














Medical Decision Making





- Medical Decision Making





Upon arrival patient was placed into room 4.  Thorough history and physical exam

was performed.  I did review the patient's transfer record.  2 failed attempts 

at hip dislocation reduction and therefore the patient will be admitted for 

orthopedic evaluation.  Called and spoke with Dr. Bucio who agreed with the 

patient.  Patient awaiting a bed on the floor in stable condition





- Lab Data


Result diagrams: 


                                 22 07:45





                                 22 07:05


                                   Lab Results











  22 Range/Units





  05:31 05:31 


 


WBC  9.53   (4.50-10.00)  X 10*3/uL


 


RBC  4.42   (4.10-5.20)  X 10*6/uL


 


Hgb  12.4   (12.0-15.0)  g/dL


 


Hct  39.9   (37.2-46.3)  %


 


MCV  90.3   (80.0-97.0)  fL


 


MCH  28.1   (27.0-32.0)  pg


 


MCHC  31.1 L   (32.0-37.0)  g/dL


 


RDW  14.5   (11.5-14.5)  %


 


Plt Count  193   (140-440)  X 10*3/uL


 


MPV  11.5   (9.5-12.2)  fL


 


Immature Gran % (Auto)  0.3   %


 


Absolute Nucleated RBC  0   (0.00-0.00)  X 10*3/uL


 


Neutrophils %  74.1   %


 


Lymphocytes %  14.7   %


 


Monocytes %  8.9   %


 


Eosinophils %  1.4   %


 


Basophils %  0.6   %


 


Immature Gran #  0.03   (0.00-0.04)  X 10*3/uL


 


Neutrophils #  7.06   (1.80-7.70)  X 10*3/uL


 


Lymphocytes #  1.40   (0.90-5.00)  X 10*3/uL


 


Monocytes #  0.85   (0.20-1.00)  X 10*3/uL


 


Eosinophils #  0.13   (0.04-0.35)  X 10*3/uL


 


Basophils #  0.06   (0.00-0.10)  X 10*3/uL


 


NRBC/100 WBC Diff  0   (0.0-0.0)  /100 WBCS


 


Sodium   143  (135-145)  mmol/L


 


Potassium   4.3  (3.5-5.5)  mmol/L


 


Chloride   107  ()  mmol/L


 


Carbon Dioxide   27.8 H  (20.0-27.5)  mmol/L


 


Anion Gap   8.20 L  (10.00-18.00)  mmol/L


 


BUN   16.8  (9.0-27.0)  mg/dL


 


Creatinine   0.7  (0.6-1.5)  mg/dL


 


Est GFR (CKD-EPI)AfAm   103.2  (60.0-200.0)   


 


Est GFR (CKD-EPI)NonAf   89.0  (60.0-200.0)   


 


BUN/Creatinine Ratio   24.00 H  (12.00-20.00)  Ratio


 


Glucose   144 H  ()  mg/dL


 


Calcium   8.8  (8.7-10.3)  mg/dL














Disposition


Clinical Impression: 


 Hip dislocation, right





Disposition: ADMITTED AS IP TO THIS [facility]


Condition: Stable


Is patient prescribed a controlled substance at d/c from ED?: No


Time of Disposition: 20:18


Decision to Admit Reason: Admit from EC


Decision Date: 22


Decision Time: 20:19

## 2022-08-03 LAB
ANION GAP SERPL CALC-SCNC: 8.2 MMOL/L (ref 10–18)
BASOPHILS # BLD AUTO: 0.06 X 10*3/UL (ref 0–0.1)
BASOPHILS NFR BLD AUTO: 0.6 %
BUN SERPL-SCNC: 16.8 MG/DL (ref 9–27)
BUN/CREAT SERPL: 24 RATIO (ref 12–20)
CALCIUM SPEC-MCNC: 8.8 MG/DL (ref 8.7–10.3)
CHLORIDE SERPL-SCNC: 107 MMOL/L (ref 96–109)
CO2 SERPL-SCNC: 27.8 MMOL/L (ref 20–27.5)
EOSINOPHIL # BLD AUTO: 0.13 X 10*3/UL (ref 0.04–0.35)
EOSINOPHIL NFR BLD AUTO: 1.4 %
ERYTHROCYTE [DISTWIDTH] IN BLOOD BY AUTOMATED COUNT: 4.42 X 10*6/UL (ref 4.1–5.2)
ERYTHROCYTE [DISTWIDTH] IN BLOOD: 14.5 % (ref 11.5–14.5)
GLUCOSE SERPL-MCNC: 144 MG/DL (ref 70–110)
HCT VFR BLD AUTO: 39.9 % (ref 37.2–46.3)
HGB BLD-MCNC: 12.4 G/DL (ref 12–15)
IMM GRANULOCYTES BLD QL AUTO: 0.3 %
LYMPHOCYTES # SPEC AUTO: 1.4 X 10*3/UL (ref 0.9–5)
LYMPHOCYTES NFR SPEC AUTO: 14.7 %
MCH RBC QN AUTO: 28.1 PG (ref 27–32)
MCHC RBC AUTO-ENTMCNC: 31.1 G/DL (ref 32–37)
MCV RBC AUTO: 90.3 FL (ref 80–97)
MONOCYTES # BLD AUTO: 0.85 X 10*3/UL (ref 0.2–1)
MONOCYTES NFR BLD AUTO: 8.9 %
NEUTROPHILS # BLD AUTO: 7.06 X 10*3/UL (ref 1.8–7.7)
NEUTROPHILS NFR BLD AUTO: 74.1 %
NRBC BLD AUTO-RTO: 0 /100 WBCS (ref 0–0)
PLATELET # BLD AUTO: 193 X 10*3/UL (ref 140–440)
POTASSIUM SERPL-SCNC: 4.3 MMOL/L (ref 3.5–5.5)
SODIUM SERPL-SCNC: 143 MMOL/L (ref 135–145)
WBC # BLD AUTO: 9.53 X 10*3/UL (ref 4.5–10)

## 2022-08-03 PROCEDURE — 0QS605Z REPOSITION RIGHT UPPER FEMUR WITH EXTERNAL FIXATION DEVICE, OPEN APPROACH: ICD-10-PCS

## 2022-08-03 RX ADMIN — Medication SCH MCG: at 07:56

## 2022-08-03 RX ADMIN — Medication SCH: at 14:12

## 2022-08-03 RX ADMIN — Medication SCH MCG: at 21:57

## 2022-08-03 RX ADMIN — MORPHINE SULFATE PRN MG: 4 INJECTION, SOLUTION INTRAMUSCULAR; INTRAVENOUS at 07:55

## 2022-08-03 RX ADMIN — DOCUSATE SODIUM AND SENNOSIDES SCH EACH: 50; 8.6 TABLET ORAL at 21:57

## 2022-08-03 RX ADMIN — PREGABALIN SCH MG: 75 CAPSULE ORAL at 21:57

## 2022-08-03 RX ADMIN — PREGABALIN SCH: 75 CAPSULE ORAL at 14:12

## 2022-08-03 RX ADMIN — ASPIRIN 325 MG ORAL TABLET SCH MG: 325 PILL ORAL at 21:57

## 2022-08-03 RX ADMIN — PREGABALIN SCH MG: 75 CAPSULE ORAL at 07:56

## 2022-08-03 RX ADMIN — MORPHINE SULFATE PRN MG: 4 INJECTION, SOLUTION INTRAMUSCULAR; INTRAVENOUS at 16:10

## 2022-08-03 RX ADMIN — CEFAZOLIN SCH MLS/HR: 330 INJECTION, POWDER, FOR SOLUTION INTRAMUSCULAR; INTRAVENOUS at 23:00

## 2022-08-03 RX ADMIN — PANTOPRAZOLE SODIUM SCH MG: 40 TABLET, DELAYED RELEASE ORAL at 07:56

## 2022-08-03 RX ADMIN — PREGABALIN SCH: 75 CAPSULE ORAL at 07:57

## 2022-08-03 RX ADMIN — SERTRALINE HYDROCHLORIDE SCH MG: 100 TABLET ORAL at 08:55

## 2022-08-03 NOTE — P.HPOR
History of Present Illness


H&P Date: 08/03/22


This patient is a 67-year-old female with past medical history rheumatoid 

arthritis, fibromyalgia who presented to [facility] [place] emergency department

yesterday after transfer from [facility] in [place], [place] for right hip 

dislocation. The patient is status-post direct anterior right total hip 

arthroplasty in August 2020 with Dr. Bucio. Patient states she was in her 

kitchen yesterday and slipped on a wet floor, landing directly onto the right 

hip.  She experienced immediate pain and inability to get up.  Her  

called EMS, who transported the patient to [facility] emergency department.

X-rays in the emergency department revealed a dislocated right total hip.  There

were 2 unsuccessful attempts at closed reduction by the ER physician, as well as

an on-call orthopedic surgeon.  The patient was transferred to [facility] [place] emergency department for further evaluation by Dr. Bucio. Patient is 

admitted under the care of Dr. Bucio with a consulted placed to internal 

medicine. 


Patient is examined bedside this morning.  She is complaining of isolated right 

hip pain. Patient states she has not had any issues with her right hip since 

surgery in 2020. She states she did not hit her head when she fell. she states 

besides her right hip, she otherwise feels well.  She denies chest pain, 

shortness of breath, nausea, vomiting. She denies numbness or tingling of the 

right lower extremity. Vital signs stable.








Past Medical History


Past Medical History: Fibromyalgia, GERD/Reflux, Osteoarthritis (OA), Rheumatoid

Arthritis (RA), Sleep Apnea/CPAP/BIPAP


Additional Past Medical History / Comment(s): seasonal allergies, has 

cpap,lymphdema rt arm


History of Any Multi-Drug Resistant Organisms: None Reported


Past Surgical History: Cholecystectomy, Hysterectomy, Joint Replacement, 

Orthopedic Surgery, Tonsillectomy


Additional Past Surgical History / Comment(s): 2 large lipomas removed rt 

axillae, 3 total knee lt x1,rt knee x2 replacement,lipoma removed from back,keith 

carpal tunnel,tendon release middle finger and thumbs keith., keith thumb joints


Past Anesthesia/Blood Transfusion Reactions: No Reported Reaction


Past Psychological History: Depression


Smoking Status: Former smoker


Past Alcohol Use History: Rare


Past Drug Use History: None Reported





- Past Family History


  ** Sister(s)


Family Medical History: Cancer


Additional Family Medical History / Comment(s): skin cancer





  ** Brother(s)


Family Medical History: Cancer


Additional Family Medical History / Comment(s): skin cancer





Medications and Allergies


                                Home Medications











 Medication  Instructions  Recorded  Confirmed  Type


 


Albuterol Inhaler [Ventolin Hfa 2 puff INHALATION RT-Q6H PRN 08/12/20 08/02/22 

History





Inhaler]    


 


Fluticasone Nasal Spray [Flonase 1 spray EA NOSTRIL DAILY PRN 08/12/20 08/02/22 

History





Nasal Spray]    


 


Leflunomide [Arava] 20 mg PO Q48H 08/12/20 08/02/22 History


 


Meloxicam [Mobic] 7.5 mg PO BID 08/12/20 08/02/22 History


 


Sertraline HCl [Zoloft] 100 mg PO DAILY 08/12/20 08/02/22 History


 


Cholecalciferol [Vitamin D3 (25 25 mcg PO TID 08/02/22 08/02/22 History





Mcg = 1000 Iu)]    


 


Esomeprazole Magnesium [NexIUM 20 mg PO DAILY 08/02/22 08/02/22 History





24Hr]    


 


Loratadine-Pseudoeph 5-120 mg 1 tab PO Q12HR PRN 08/02/22 08/02/22 History





[Claritin-D 12 Hour]    


 


Pregabalin [Lyrica] 150 mg PO TID 08/02/22 08/02/22 History








                                    Allergies











Allergy/AdvReac Type Severity Reaction Status Date / Time


 


amoxicillin [From Augmentin] Allergy  Nausea Verified 08/02/22 21:44


 


aripiprazole [From Abilify] Allergy  Unknown Verified 08/02/22 21:44


 


clavulanic acid Allergy  Nausea Verified 08/02/22 21:44





[From Augmentin]     


 


methotrexate Allergy  ringing in Verified 08/02/22 21:44





   ears  


 


Sulfa (Sulfonamide Allergy  Rash/Hives, Verified 08/02/22 21:44





Antibiotics)   itchy  














Physical Examination


On examination, patient is sitting up in bed in no apparent distress.  She is 

alert and oriented 3.  Her head appears normocephalic and atraumatic.  Her 

breathing appears nonlabored.  On inspection of her bilateral upper extremities,

there are no obvious deformities or signs of trauma. On inspection of her left 

lower extremity, there are no obvious deformities or signs of trauma. On 

inspection of the right lower extremity, there is a healed incision at the 

anterior knee consistent with a total knee arthroplasty. On inspection of the 

right hip, there are no open wounds or lacerations, skin is intact.  There is 

diffuse pain on palpation of the right hip.  No pain with palpation of the right

knee, leg, ankle, foot.  Range of motion of the right lower extremity is not 

attempted at this time.  Patient has good strength and range-of-motion of the 

right ankle and foot.  Motor and sensory function is intact of the right lower 

extremity.  The dorsalis pedis pulses easily palpable, the right lower extremity

is warm and well perfused with brisk capillary refill distally. Calf is non-

tender. 





Results





- Labs


Labs: 


                  Abnormal Lab Results - Last 24 Hours (Table)











  08/03/22 08/03/22 Range/Units





  05:31 05:31 


 


MCHC  31.1 L   (32.0-37.0)  g/dL


 


Carbon Dioxide   27.8 H  (20.0-27.5)  mmol/L


 


Anion Gap   8.20 L  (10.00-18.00)  mmol/L


 


BUN/Creatinine Ratio   24.00 H  (12.00-20.00)  Ratio


 


Glucose   144 H  ()  mg/dL








                                      H & H











  08/03/22 Range/Units





  05:31 


 


Hgb  12.4  (12.0-15.0)  g/dL


 


Hct  39.9  (37.2-46.3)  %











Result Diagrams: 


                                 08/03/22 05:31





                                 08/03/22 05:31





Assessment and Plan


Assessment: 


Right hip dislocation


 Status-post direct anterior right total hip arthroplasty August 2020 with Dr. Bucio





Plan: 


- Patient was also examined by Dr. Bucio this morning. Recommend closed 

versus open reduction right total hip in the operating room this afternoon. 

Internal medicine has been consulted for medical evaluation prior to OR. 


- Strict non-weight bearing right lower extremity. Bed rest. 


- Pain management as needed. 


- NPO diet. Will plan for OR this afternoon.

## 2022-08-03 NOTE — XR
EXAMINATION TYPE: XR Hip Limited RT

 

DATE OF EXAM: 8/3/2022

 

COMPARISON: Yesterday

 

HISTORY: Hip surgery

 

TECHNIQUE: Single view

 

FINDINGS: There is right hip prosthesis. Components appear in anatomic position.

 

IMPRESSION: No complicating process seen. There is anatomic reduction of the prosthetic femoral head 
compared to exam yesterday.

## 2022-08-03 NOTE — FL
EXAMINATION TYPE: FL guidance operating room, XR Hip Limited RT

 

DATE OF EXAM: 8/3/2022

 

COMPARISON: NONE

 

HISTORY: 67-year-old female dislocation

 

TECHNIQUE: Intraoperative fluoroscopy.

 

FINDINGS:

Intraoperative fluoroscopy during closed reduction of the prosthetic right hip.

 

FLUOROSCOPY

 

Fluoroscopy time of 51 seconds was used during closed reduction of the prosthetic right hip..  3 imag
e/s document/s the procedure.

 

 

IMPRESSION:

Intraoperative fluoroscopy as above.

## 2022-08-03 NOTE — P.OP
Date of Procedure: 08/03/22


Preoperative Diagnosis: 


Dislocated right total hip arthroplasty


Postoperative Diagnosis: 


Dislocated right total hip arthroplasty


Procedure(s) Performed: 


Attempted closed reduction and open reduction right total hip arthroplasty


Anesthesia: ANNMARIE


Surgeon: Miki Bucio


Assistant #1: Evan Solomon


Estimated Blood Loss (ml): 50


Pathology: none sent


Condition: stable


Disposition: PACU


Indications for Procedure: 


This is a 67-year-old female that has had a right total hip arthroplasty 

approximately 2 years ago.  Hip arthroplasty was functioning well when yesterday

she slipped and twisted her right hip and fell to the floor.  She was 

transported to the hospital and diagnosed with a dislocation of right total hip 

arthroplasty.  Multiple attempted reductions were attempted but were 

unsuccessful.  Patient was then transported to [facility] for further 

care.  I saw and evaluated the patient emergency room, and after discussing the 

surgical and nonsurgical treatment options with her and her  at length, I

recommended an attempted closed reduction under general anesthetic with possible

open reduction if necessary.  Informed consent was obtained.


Operative Findings: 


The operative findings are consistent with a closed dislocated right total hip 

arthroplasty with the femoral head incarcerated under the rectus femoris muscle.


Description of Procedure: 


Patient was seen and evaluated in the preoperative area and the consent was 

reviewed.  The operative site was marked with a skin marker.  The patient was 

then brought to the operating room and given preoperative antibiotics intr

avenously..  A general anesthetic was administered by the anesthesia department.

  The patient was then placed on the Gilead table with the bony prominences well-

padded.  .





A universal timeout was then performed, which confirmed the patient's name, 

surgical site, ALLERGIES, and procedure being performed on the consent.  





An attempt at a close reduction was then performed.  Despite multiple attempts 

and the use of fluoroscopy, the hip was unable to be reduced.  The decision to 

convert to an open reduction was then made.  





The hip area was then prepped with a ChloraPrep solution and draped in the usual

sterile fashion.  Next the incision site was located at 1 cm distal and 2 cm 

lateral to the anterior superior iliac spine along her prior incision.  The skin

and subcutaneous tissues were sharply incised.  Incision was carefully dissected

down to the fascia overlying the tensor fascia xena muscle.  This fascia was 

then incised in line with the incision.  Care was taken to stay laterally in 

order to avoid injuring the lateral femoral cutaneous nerve.  Next, using blunt 

finger dissection, the tensor fascia xena muscle was dissected off its investing

fascia.  The muscle was then carefully retracted laterally with a cobra 

retractor over the lateral neck of the femur.  The femoral head was then 

visualized and found to be incarcerated under the rectus femoris muscle.  The 

femoral head was then carefully dissected and extracted from the rectus femoris 

muscle.  The femoral component and femoral head were inspected and found to be 

intact without any damage.  Acetabulum was also inspected and found to be intact

without any damage.





T the hip was gently reduced with the aid of the Gilead table.  Fluoroscopy was 

then used to check position of the components, as well as to ensure equal leg 

lengths.  The hip was taken through range of motion and stressed in all degrees 

of motion and found to be stable.  Final fluoroscopic x-rays confirmed that the 

components were in anatomic position, as well as equal leg lengths.  The hip was

also taken through range of motion, and found to be stable.





The hip was then copiously irrigated with antibiotic solution with pulsatile 

lavage.





The fascia was then closed with 2-0 strata fix suture.  The subcutaneous tissue 

was closed with 3-0 Vicryl.  The subcuticular tissue was closed with 3-0 strata 

fix suture.  The skin was then closed with Exofin skin glue.  After the glue and

dried, and Optifoam silver impregnated dressing was applied.  The patient was 

then transferred to the recovery room in stable condition.





The assistant  CHANDRAKANT Arrieta was required due to the complexity of surgery, 

and the need for skilled surgical assistant for positioning, draping, exposure, 

retraction, and closure of the wound.

## 2022-08-03 NOTE — P.CONS
History of Present Illness





- Reason for Consult


Preoperative clearance





- History of Present Illness


67-year-old female with a history of lumbar arthritis came in after a mechanical

fall and had right hip patient had right hip arthroplasty about any ago.  

Patient does have pain.  Patient does have history of rheumatoid arthritis and 

patient denied any coronary artery disease patient is not a smoker.  Patient 

denied any history of can start failure and her functionality is good.











REVIEW OF SYSTEMS: 


CONSTITUTIONAL: No fever, no malaise, no fatigue. 


HEENT: No recent visual problems or hearing problems. Denied any sore throat. 


CARDIOVASCULAR: No chest pain, orthopnea, PND, no palpitations, no syncope. 


PULMONARY: No shortness of breath, no cough, no hemoptysis. 


GASTROINTESTINAL: No diarrhea, no nausea, no vomiting, no abdominal pain. 


NEUROLOGICAL: No headaches, no weakness, no numbness. 


HEMATOLOGICAL: Denies any bleeding or petechiae. 


GENITOURINARY: Denies any burning micturition, frequency, or urgency. 


MUSCULOSKELETAL/RHEUMATOLOGICAL: Pain in the right hip 


ENDOCRINE: Denies any polyuria or polydipsia. 





The rest of the 14-point review of systems is negative.











PHYSICAL EXAMINATION: 





GENERAL: The patient is alert and oriented x3, not in any acute distress. Well 

developed, well nourished. 


HEENT: Pupils are round and equally reacting to light. EOMI. No scleral icterus.

No conjunctival pallor. Normocephalic, atraumatic. No pharyngeal erythema. No 

thyromegaly. 


CARDIOVASCULAR: S1 and S2 present. No murmurs, rubs, or gallops. 


PULMONARY: Chest is clear to auscultation, no wheezing or crackles. 


ABDOMEN: Soft, nontender, nondistended, normoactive bowel sounds. No palpable 

organomegaly. 


MUSCULOSKELETAL: Pain in the right hip and deferred exam to orthopedic surgery 

EXTREMITIES: No cyanosis, clubbing, or pedal edema. 


NEUROLOGICAL: Gross neurological examination did not reveal any focal deficits. 


SKIN: No rashes. 





Assessment and plan


-Preoperative clearance for orthopedic surgery for right hip dislocation: 

Patient is low operative risk patient is expected well postoperatively.  No 

further testing is necessary at this time.


-Rheumatoid arthritis patient's immunosuppressants for rheumatoid arthritis will

be started tomorrow after surgery


-Depression for which patient takes sertraline which will be resumed


-Gastroesophageal reflux disease


-





DVT prophylaxis: As per primary service








Past Medical History


Past Medical History: Fibromyalgia, GERD/Reflux, Osteoarthritis (OA), Rheumatoid

Arthritis (RA), Sleep Apnea/CPAP/BIPAP


Additional Past Medical History / Comment(s): seasonal allergies, has cpap,ly

mphdema rt arm


History of Any Multi-Drug Resistant Organisms: None Reported


Past Surgical History: Cholecystectomy, Hysterectomy, Joint Replacement, O

rthopedic Surgery, Tonsillectomy


Additional Past Surgical History / Comment(s): 2 large lipomas removed rt 

axillae, 3 total knee lt x1,rt knee x2 replacement,lipoma removed from back,keith 

carpal tunnel,tendon release middle finger and thumbs keith., keith thumb joints


Past Anesthesia/Blood Transfusion Reactions: No Reported Reaction


Past Psychological History: Depression


Smoking Status: Former smoker


Past Alcohol Use History: Rare


Past Drug Use History: None Reported





- Past Family History


  ** Sister(s)


Family Medical History: Cancer


Additional Family Medical History / Comment(s): skin cancer





  ** Brother(s)


Family Medical History: Cancer


Additional Family Medical History / Comment(s): skin cancer





Medications and Allergies


                                Home Medications











 Medication  Instructions  Recorded  Confirmed  Type


 


Albuterol Inhaler [Ventolin Hfa 2 puff INHALATION RT-Q6H PRN 08/12/20 08/02/22 

History





Inhaler]    


 


Fluticasone Nasal Spray [Flonase 1 spray EA NOSTRIL DAILY PRN 08/12/20 08/02/22 

History





Nasal Spray]    


 


Leflunomide [Arava] 20 mg PO Q48H 08/12/20 08/02/22 History


 


Meloxicam [Mobic] 7.5 mg PO BID 08/12/20 08/02/22 History


 


Sertraline HCl [Zoloft] 100 mg PO DAILY 08/12/20 08/02/22 History


 


Cholecalciferol [Vitamin D3 (25 25 mcg PO TID 08/02/22 08/02/22 History





Mcg = 1000 Iu)]    


 


Esomeprazole Magnesium [NexIUM 20 mg PO DAILY 08/02/22 08/02/22 History





24Hr]    


 


Loratadine-Pseudoeph 5-120 mg 1 tab PO Q12HR PRN 08/02/22 08/02/22 History





[Claritin-D 12 Hour]    


 


Pregabalin [Lyrica] 150 mg PO TID 08/02/22 08/02/22 History








                                    Allergies











Allergy/AdvReac Type Severity Reaction Status Date / Time


 


amoxicillin [From Augmentin] Allergy  Nausea Verified 08/02/22 21:44


 


aripiprazole [From Abilify] Allergy  Unknown Verified 08/02/22 21:44


 


clavulanic acid Allergy  Nausea Verified 08/02/22 21:44





[From Augmentin]     


 


methotrexate Allergy  ringing in Verified 08/02/22 21:44





   ears  


 


Sulfa (Sulfonamide Allergy  Rash/Hives, Verified 08/02/22 21:44





Antibiotics)   itchy  














Physical Exam


Vitals: 


                                   Vital Signs











  Temp Pulse Pulse Resp BP Pulse Ox


 


 08/03/22 06:41   77   18  129/62  94 L


 


 08/03/22 02:21   89   16  133/66  93 L


 


 08/02/22 23:50   92   10 L  134/62  94 L


 


 08/02/22 23:40   92   16  134/62  93 L


 


 08/02/22 23:30   92   15  134/62  93 L


 


 08/02/22 23:20   89   14  134/62  94 L


 


 08/02/22 23:10   88   15  134/62  94 L


 


 08/02/22 22:50   99   18  153/80  93 L


 


 08/02/22 22:40   98   13  153/80  92 L


 


 08/02/22 22:30   100   12  153/80  92 L


 


 08/02/22 22:20   101 H   12  153/80  92 L


 


 08/02/22 22:10   99   16  153/80  93 L


 


 08/02/22 22:00   96   16  176/79  94 L


 


 08/02/22 21:50   96   16  176/79  94 L


 


 08/02/22 21:40   97   12  176/79  95


 


 08/02/22 21:30   95   15  176/79  96


 


 08/02/22 21:20   93   16  176/79  97


 


 08/02/22 21:10   90   16  176/79  96


 


 08/02/22 21:00   89   14  176/136  94 L


 


 08/02/22 20:50   90   15  176/136  87 L


 


 08/02/22 20:40   87   15  176/136  87 L


 


 08/02/22 20:33   82   16  176/136  95


 


 08/02/22 20:30   96   23  176/136  92 L


 


 08/02/22 20:20   91   14  176/136  94 L


 


 08/02/22 20:10   92   17  176/136 


 


 08/02/22 20:00   84   15  171/94  93 L


 


 08/02/22 19:57    77   


 


 08/02/22 19:50   84   64 H  171/94  99


 


 08/02/22 19:42       87 L


 


 08/02/22 19:41  98.7 F  79   16  171/94  98








                                Intake and Output











 08/02/22 08/03/22 08/03/22





 22:59 06:59 14:59


 


Other:   


 


  Weight 110 kg  














Results


CBC & Chem 7: 


                                 08/03/22 05:31





                                 08/03/22 05:31


Labs: 


                  Abnormal Lab Results - Last 24 Hours (Table)











  08/03/22 08/03/22 Range/Units





  05:31 05:31 


 


MCHC  31.1 L   (32.0-37.0)  g/dL


 


Carbon Dioxide   27.8 H  (20.0-27.5)  mmol/L


 


Anion Gap   8.20 L  (10.00-18.00)  mmol/L


 


BUN/Creatinine Ratio   24.00 H  (12.00-20.00)  Ratio


 


Glucose   144 H  ()  mg/dL

## 2022-08-03 NOTE — P.CONS
History of Present Illness





- Reason for Consult


Preoperative clearance





- History of Present Illness


Patient is pleasant 73-year-old female came in after a fall and found to have 

displaced left distal radius fracture and disparately left intertrochanteric 

femoral fracture.  Patient had a mechanical fall didn't have any syncope patient

doesn't have any history of significant heart disease except for SVT patient 

does take Lasix for peripheral edema.  Patient denied any chest pain at this 

time doesn't have any history of congestive heart failure denied any nicotine 

use no other significant medical problems.  Patient is a functional with 

functionality of greater than 4 METS.











REVIEW OF SYSTEMS: 


CONSTITUTIONAL: No fever, no malaise, no fatigue. 


HEENT: No recent visual problems or hearing problems. Denied any sore throat. 


CARDIOVASCULAR: No chest pain, orthopnea, PND, no palpitations, no syncope. 


PULMONARY: No shortness of breath, no cough, no hemoptysis. 


GASTROINTESTINAL: No diarrhea, no nausea, no vomiting, no abdominal pain. 


NEUROLOGICAL: No headaches, no weakness, no numbness. 


HEMATOLOGICAL: Denies any bleeding or petechiae. 


GENITOURINARY: Denies any burning micturition, frequency, or urgency. 


MUSCULOSKELETAL/RHEUMATOLOGICAL: Pain in the left arm and left leg ENDOCRINE: 

Denies any polyuria or polydipsia. 





The rest of the 14-point review of systems is negative.











PHYSICAL EXAMINATION: 





GENERAL: The patient is alert and oriented x3, not in any acute distress. Well 

developed, well nourished. 


HEENT: Pupils are round and equally reacting to light. EOMI. No scleral icterus.

No conjunctival pallor. Normocephalic, atraumatic. No pharyngeal erythema. No 

thyromegaly. 


CARDIOVASCULAR: S1 and S2 present. No murmurs, rubs, or gallops. 


PULMONARY: Chest is clear to auscultation, no wheezing or crackles. 


ABDOMEN: Soft, nontender, nondistended, normoactive bowel sounds. No palpable 

organomegaly. 


MUSCULOSKELETAL: Deferred to orthopedic surgery 


EXTREMITIES: No cyanosis, clubbing, or pedal edema. 


NEUROLOGICAL: Gross neurological examination did not reveal any focal deficits. 


SKIN: No rashes. 





Assessment and plan


-Fall with the left distal radius fracture and left intertrochanteric fracture: 

Patient is low operative risk for surgery will obtain EKG patient is expected to

do well with surgery.  


-hypertension patient blood pressure is low because of her on opiate she is 

receiving for pain, we'll hold off on amlodipine and Lasix at this time


-History of supraventricular tachycardia no further mention intervention at this

time will monitor postoperatively


-Hyperlipidemia for which patient is on Lipitor to resumed








DVT prophylaxis: As per primary service








Past Medical History


Past Medical History: Fibromyalgia, GERD/Reflux, Osteoarthritis (OA), Rheumatoid

Arthritis (RA), Sleep Apnea/CPAP/BIPAP


Additional Past Medical History / Comment(s): seasonal allergies, has 

cpap,lymphdema rt arm


History of Any Multi-Drug Resistant Organisms: None Reported


Past Surgical History: Cholecystectomy, Hysterectomy, Joint Replacement, 

Orthopedic Surgery, Tonsillectomy


Additional Past Surgical History / Comment(s): 2 large lipomas removed rt 

axillae, 3 total knee lt x1,rt knee x2 replacement,lipoma removed from back,keith 

carpal tunnel,tendon release middle finger and thumbs keith., keith thumb joints


Past Anesthesia/Blood Transfusion Reactions: No Reported Reaction


Past Psychological History: Depression


Smoking Status: Former smoker


Past Alcohol Use History: Rare


Past Drug Use History: None Reported





- Past Family History


  ** Sister(s)


Family Medical History: Cancer


Additional Family Medical History / Comment(s): skin cancer





  ** Brother(s)


Family Medical History: Cancer


Additional Family Medical History / Comment(s): skin cancer





Medications and Allergies


                                Home Medications











 Medication  Instructions  Recorded  Confirmed  Type


 


Albuterol Inhaler [Ventolin Hfa 2 puff INHALATION RT-Q6H PRN 08/12/20 08/02/22 

History





Inhaler]    


 


Fluticasone Nasal Spray [Flonase 1 spray EA NOSTRIL DAILY PRN 08/12/20 08/02/22 

History





Nasal Spray]    


 


Leflunomide [Arava] 20 mg PO Q48H 08/12/20 08/02/22 History


 


Meloxicam [Mobic] 7.5 mg PO BID 08/12/20 08/02/22 History


 


Sertraline HCl [Zoloft] 100 mg PO DAILY 08/12/20 08/02/22 History


 


Cholecalciferol [Vitamin D3 (25 25 mcg PO TID 08/02/22 08/02/22 History





Mcg = 1000 Iu)]    


 


Esomeprazole Magnesium [NexIUM 20 mg PO DAILY 08/02/22 08/02/22 History





24Hr]    


 


Loratadine-Pseudoeph 5-120 mg 1 tab PO Q12HR PRN 08/02/22 08/02/22 History





[Claritin-D 12 Hour]    


 


Pregabalin [Lyrica] 150 mg PO TID 08/02/22 08/02/22 History








                                    Allergies











Allergy/AdvReac Type Severity Reaction Status Date / Time


 


amoxicillin [From Augmentin] Allergy  Nausea Verified 08/02/22 21:44


 


aripiprazole [From Abilify] Allergy  Unknown Verified 08/02/22 21:44


 


clavulanic acid Allergy  Nausea Verified 08/02/22 21:44





[From Augmentin]     


 


methotrexate Allergy  ringing in Verified 08/02/22 21:44





   ears  


 


Sulfa (Sulfonamide Allergy  Rash/Hives, Verified 08/02/22 21:44





Antibiotics)   itchy  














Physical Exam


Vitals: 


                                   Vital Signs











  Temp Pulse Pulse Resp BP Pulse Ox


 


 08/03/22 06:41   77   18  129/62  94 L


 


 08/03/22 02:21   89   16  133/66  93 L


 


 08/02/22 23:50   92   10 L  134/62  94 L


 


 08/02/22 23:40   92   16  134/62  93 L


 


 08/02/22 23:30   92   15  134/62  93 L


 


 08/02/22 23:20   89   14  134/62  94 L


 


 08/02/22 23:10   88   15  134/62  94 L


 


 08/02/22 22:50   99   18  153/80  93 L


 


 08/02/22 22:40   98   13  153/80  92 L


 


 08/02/22 22:30   100   12  153/80  92 L


 


 08/02/22 22:20   101 H   12  153/80  92 L


 


 08/02/22 22:10   99   16  153/80  93 L


 


 08/02/22 22:00   96   16  176/79  94 L


 


 08/02/22 21:50   96   16  176/79  94 L


 


 08/02/22 21:40   97   12  176/79  95


 


 08/02/22 21:30   95   15  176/79  96


 


 08/02/22 21:20   93   16  176/79  97


 


 08/02/22 21:10   90   16  176/79  96


 


 08/02/22 21:00   89   14  176/136  94 L


 


 08/02/22 20:50   90   15  176/136  87 L


 


 08/02/22 20:40   87   15  176/136  87 L


 


 08/02/22 20:33   82   16  176/136  95


 


 08/02/22 20:30   96   23  176/136  92 L


 


 08/02/22 20:20   91   14  176/136  94 L


 


 08/02/22 20:10   92   17  176/136 


 


 08/02/22 20:00   84   15  171/94  93 L


 


 08/02/22 19:57    77   


 


 08/02/22 19:50   84   64 H  171/94  99


 


 08/02/22 19:42       87 L


 


 08/02/22 19:41  98.7 F  79   16  171/94  98








                                Intake and Output











 08/02/22 08/03/22 08/03/22





 22:59 06:59 14:59


 


Other:   


 


  Weight 110 kg  














Results


CBC & Chem 7: 


                                 08/03/22 05:31





                                 08/03/22 05:31


Labs: 


                  Abnormal Lab Results - Last 24 Hours (Table)











  08/03/22 08/03/22 Range/Units





  05:31 05:31 


 


MCHC  31.1 L   (32.0-37.0)  g/dL


 


Carbon Dioxide   27.8 H  (20.0-27.5)  mmol/L


 


Anion Gap   8.20 L  (10.00-18.00)  mmol/L


 


BUN/Creatinine Ratio   24.00 H  (12.00-20.00)  Ratio


 


Glucose   144 H  ()  mg/dL

## 2022-08-04 VITALS — RESPIRATION RATE: 17 BRPM

## 2022-08-04 LAB
BASOPHILS # BLD AUTO: 0.04 X 10*3/UL (ref 0–0.1)
BASOPHILS NFR BLD AUTO: 0.5 %
EOSINOPHIL # BLD AUTO: 0.05 X 10*3/UL (ref 0.04–0.35)
EOSINOPHIL NFR BLD AUTO: 0.6 %
ERYTHROCYTE [DISTWIDTH] IN BLOOD BY AUTOMATED COUNT: 4.33 X 10*6/UL (ref 4.1–5.2)
ERYTHROCYTE [DISTWIDTH] IN BLOOD: 14 % (ref 11.5–14.5)
HCT VFR BLD AUTO: 39.3 % (ref 37.2–46.3)
HGB BLD-MCNC: 12.4 G/DL (ref 12–15)
IMM GRANULOCYTES BLD QL AUTO: 0.3 %
LYMPHOCYTES # SPEC AUTO: 0.75 X 10*3/UL (ref 0.9–5)
LYMPHOCYTES NFR SPEC AUTO: 8.5 %
MCH RBC QN AUTO: 28.6 PG (ref 27–32)
MCHC RBC AUTO-ENTMCNC: 31.6 G/DL (ref 32–37)
MCV RBC AUTO: 90.8 FL (ref 80–97)
MONOCYTES # BLD AUTO: 0.65 X 10*3/UL (ref 0.2–1)
MONOCYTES NFR BLD AUTO: 7.3 %
NEUTROPHILS # BLD AUTO: 7.33 X 10*3/UL (ref 1.8–7.7)
NEUTROPHILS NFR BLD AUTO: 82.8 %
NRBC BLD AUTO-RTO: 0 /100 WBCS (ref 0–0)
PLATELET # BLD AUTO: 213 X 10*3/UL (ref 140–440)
WBC # BLD AUTO: 8.85 X 10*3/UL (ref 4.5–10)

## 2022-08-04 RX ADMIN — PANTOPRAZOLE SODIUM SCH MG: 40 TABLET, DELAYED RELEASE ORAL at 08:52

## 2022-08-04 RX ADMIN — CEFAZOLIN SCH MLS/HR: 330 INJECTION, POWDER, FOR SOLUTION INTRAMUSCULAR; INTRAVENOUS at 08:53

## 2022-08-04 RX ADMIN — ASPIRIN 325 MG ORAL TABLET SCH MG: 325 PILL ORAL at 09:03

## 2022-08-04 RX ADMIN — SERTRALINE HYDROCHLORIDE SCH MG: 100 TABLET ORAL at 08:51

## 2022-08-04 RX ADMIN — Medication SCH MCG: at 21:41

## 2022-08-04 RX ADMIN — Medication SCH MCG: at 17:55

## 2022-08-04 RX ADMIN — ASPIRIN 325 MG ORAL TABLET SCH MG: 325 PILL ORAL at 21:41

## 2022-08-04 RX ADMIN — ISODIUM CHLORIDE PRN MG: 0.03 SOLUTION RESPIRATORY (INHALATION) at 13:01

## 2022-08-04 RX ADMIN — PREGABALIN SCH MG: 75 CAPSULE ORAL at 08:52

## 2022-08-04 RX ADMIN — ISODIUM CHLORIDE PRN MG: 0.03 SOLUTION RESPIRATORY (INHALATION) at 20:31

## 2022-08-04 RX ADMIN — PREGABALIN SCH MG: 75 CAPSULE ORAL at 21:41

## 2022-08-04 RX ADMIN — Medication SCH MCG: at 08:51

## 2022-08-04 RX ADMIN — DOCUSATE SODIUM AND SENNOSIDES SCH EACH: 50; 8.6 TABLET ORAL at 21:41

## 2022-08-04 RX ADMIN — PREGABALIN SCH MG: 75 CAPSULE ORAL at 17:54

## 2022-08-04 NOTE — P.PN
Subjective


Progress Note Date: 08/04/22


Principal diagnosis: 


Dislocated right total hip.


Status post open reduction right total hip.





This is a 59-year-old female who is postop day #1 status post attempted to close

reduction and subsequent open reduction right total hip dislocation.  The 

patient is doing well from an orthopedic standpoint.  She has no new complaints 

or concerns today.  She has not yet worked with physical therapy.








Objective





- Vital Signs


Vital signs: 


                                   Vital Signs











Temp  98.5 F   08/04/22 07:43


 


Pulse  86   08/04/22 07:43


 


Resp  18   08/04/22 07:43


 


BP  92/61   08/04/22 07:43


 


Pulse Ox  94 L  08/04/22 08:08


 


FiO2  44   08/03/22 22:20








                                 Intake & Output











 08/03/22 08/04/22 08/04/22





 18:59 06:59 18:59


 


Intake Total 450 750 


 


Output Total 50 700 


 


Balance 400 50 


 


Weight 110 kg  


 


Intake:   


 


   750 


 


Output:   


 


  Urine  700 


 


  Estimated Blood Loss 50  


 


Other:   


 


  Voiding Method  Indwelling Catheter 


 


  # Voids 0  














- Exam


This is a pleasant 59-year-old female in no acute distress.  She is alert and 

oriented 3.  Exam of the right hip reveals dressing is clean, dry and intact.  

Leg lengths are equal.  She has full foot and ankle motion without difficulty or

pain.  Neurovascular status to the lower extremity is intact.








- Labs


CBC & Chem 7: 


                                 08/03/22 05:31





                                 08/03/22 05:31


Labs: 


                  Abnormal Lab Results - Last 24 Hours (Table)











  08/03/22 08/03/22 Range/Units





  05:31 05:31 


 


MCHC  31.1 L   (32.0-37.0)  g/dL


 


Carbon Dioxide   27.8 H  (20.0-27.5)  mmol/L


 


Anion Gap   8.20 L  (10.00-18.00)  mmol/L


 


BUN/Creatinine Ratio   24.00 H  (12.00-20.00)  Ratio


 


Glucose   144 H  ()  mg/dL














Assessment and Plan


(1) Hip dislocation, right


Current Visit: Yes   Status: Acute   Code(s): S73.004A - UNSPECIFIED DISLOCATION

OF RIGHT HIP, INITIAL ENCOUNTER   SNOMED Code(s): 889649542


   





(2) Osteoarthritis of right hip


Current Visit: No   Status: Acute   Code(s): M16.11 - UNILATERAL PRIMARY 

OSTEOARTHRITIS, RIGHT HIP   SNOMED Code(s): 157540810281922


   


Plan: 


The clinical findings are discussed with the patient.  She'll begin physical 

therapy today.  Anticipate discharge to home tomorrow.

## 2022-08-04 NOTE — P.PN
Subjective


Progress Note Date: 08/04/22


67-year-old female with a history of lumbar arthritis came in after a mechanical

fall and had right hip patient had right hip arthroplasty about any ago.  

Patient does have pain.  Patient does have history of rheumatoid arthritis and 

patient denied any coronary artery disease patient is not a smoker.  Patient 

denied any history of can start failure and her functionality is good.





08/04/2022


Patient is postoperative open reduction of right hip. She reports no pain today,

she is pending evaluation by physical therapy and states plan is for discharge 

tomorrow. She used CPAP last night and did require oxygen via nasal cannula 

states her sats were at 84%. She is weaned off oxygen today and is on room air 

at the time of my exam. She does have some scatter coarse rhonci more in the 

right base, no wheezing. She states she was treated for bronchitis and UTI 

outpatient. She states she was at the tail end of having bronchitis. She has 

ventolin nebulizer on board which we will recommend to receive today and 

overnight. Was also supposed to have follow up at primary care office for repeat

urinalysis which we will defer as she reports no dysuria, no urgency, and no 

urinary frequency. She also requesting claritin. No white count. Remains 

afebrile. 





Review of Systems





Constitutional: Denied any fatigue denied any fever.


Cardio vascular: denied any chest pain, palpitations


Gastrointestinal: denied any nausea, vomiting, diarrhea


Pulmonary: Denied any shortness of breath cough She does have post nasal drip.


Neurologic denied any new focal deficits





All inpatient medications were reviewed and appropriate changes in these 

medications as dictated in the interval history and assessment and plan.





REVIEW OF SYSTEMS: 


CONSTITUTIONAL: No fever, no malaise, no fatigue. 


HEENT: No recent visual problems or hearing problems. Denied any sore throat. 


CARDIOVASCULAR: No chest pain, orthopnea, PND, no palpitations, no syncope. 


PULMONARY: No shortness of breath, no cough, no hemoptysis. 


GASTROINTESTINAL: No diarrhea, no nausea, no vomiting, no abdominal pain. 


NEUROLOGICAL: No headaches, no weakness, no numbness. 


HEMATOLOGICAL: Denies any bleeding or petechiae. 


GENITOURINARY: Denies any burning micturition, frequency, or urgency. 


MUSCULOSKELETAL/RHEUMATOLOGICAL: Pain in the right hip 


ENDOCRINE: Denies any polyuria or polydipsia. 





The rest of the 14-point review of systems is negative.





PHYSICAL EXAMINATION: 





GENERAL: The patient is alert and oriented x3, not in any acute distress. Well d

eveloped, well nourished. 


HEENT: Pupils are round and equally reacting to light. EOMI. No scleral icterus.

No conjunctival pallor. Normocephalic, atraumatic. No pharyngeal erythema. No 

thyromegaly. 


CARDIOVASCULAR: S1 and S2 present. No murmurs, rubs, or gallops. 


PULMONARY: Faint scattered rhonchi more right base than left 


ABDOMEN: Soft, nontender, nondistended, normoactive bowel sounds. No palpable 

organomegaly. 


MUSCULOSKELETAL: Pain in the right hip and deferred exam to orthopedic surgery 

EXTREMITIES: No cyanosis, clubbing, or pedal edema. 


NEUROLOGICAL: Gross neurological examination did not reveal any focal deficits. 


SKIN: No rashes. 





Assessment and plan


-Postoperative day #1 open reduction for right hip dislocation


-Rheumatoid arthritis can resume Arava


-Depression 


-Gastroesophageal reflux disease


-Recent treatment for bronchitis continue on ventolin nebulizer and continue to 

encourage incentive spirometer





DVT prophylaxis: As per primary service


GI Prophylaxis: Protonix





Full Code


Thank you for this consultation 





The impression and plan of care has been dictated by Elaine Ponce Nurse 

Practitioner as directed.





Dr. Richard MD


I have performed a history and physical examination and medical decision making 

of this patient, discussed the same with the dictator, and agree with the 

dictators assessment and plan as written, documented as a scribe. Based on total

visit time, I have performed more than 50% of this visit. 











Objective





- Vital Signs


Vital signs: 


                                   Vital Signs











Temp  98.5 F   08/04/22 13:43


 


Pulse  100   08/04/22 13:43


 


Resp  17   08/04/22 13:43


 


BP  144/59   08/04/22 13:43


 


Pulse Ox  95   08/04/22 13:43


 


FiO2  44   08/03/22 22:20








                                 Intake & Output











 08/03/22 08/04/22 08/04/22





 18:59 06:59 18:59


 


Intake Total 450 750 


 


Output Total 50 700 


 


Balance 400 50 


 


Weight 110 kg  


 


Intake:   


 


   750 


 


Output:   


 


  Urine  700 


 


  Estimated Blood Loss 50  


 


Other:   


 


  Voiding Method  Indwelling Catheter 


 


  # Voids 0  














- Labs


CBC & Chem 7: 


                                 08/04/22 07:45





                                 08/03/22 05:31


Labs: 


                  Abnormal Lab Results - Last 24 Hours (Table)











  08/04/22 Range/Units





  07:45 


 


MCHC  31.6 L  (32.0-37.0)  g/dL


 


Lymphocytes #  0.75 L  (0.90-5.00)  X 10*3/uL














Assessment and Plan


Time with Patient: Less than 30

## 2022-08-05 VITALS — DIASTOLIC BLOOD PRESSURE: 77 MMHG | SYSTOLIC BLOOD PRESSURE: 138 MMHG | HEART RATE: 80 BPM

## 2022-08-05 VITALS — TEMPERATURE: 97.9 F

## 2022-08-05 LAB
ANION GAP SERPL CALC-SCNC: 8.2 MMOL/L (ref 10–18)
BUN SERPL-SCNC: 20.2 MG/DL (ref 9–27)
BUN/CREAT SERPL: 22.44 RATIO (ref 12–20)
CALCIUM SPEC-MCNC: 8.7 MG/DL (ref 8.7–10.3)
CHLORIDE SERPL-SCNC: 110 MMOL/L (ref 96–109)
CO2 SERPL-SCNC: 26.8 MMOL/L (ref 20–27.5)
GLUCOSE SERPL-MCNC: 151 MG/DL (ref 70–110)
POTASSIUM SERPL-SCNC: 4.4 MMOL/L (ref 3.5–5.5)
SODIUM SERPL-SCNC: 145 MMOL/L (ref 135–145)

## 2022-08-05 RX ADMIN — Medication SCH MCG: at 08:23

## 2022-08-05 RX ADMIN — PANTOPRAZOLE SODIUM SCH MG: 40 TABLET, DELAYED RELEASE ORAL at 08:23

## 2022-08-05 RX ADMIN — SERTRALINE HYDROCHLORIDE SCH MG: 100 TABLET ORAL at 08:23

## 2022-08-05 RX ADMIN — CEFAZOLIN SCH: 330 INJECTION, POWDER, FOR SOLUTION INTRAMUSCULAR; INTRAVENOUS at 12:56

## 2022-08-05 RX ADMIN — ASPIRIN 325 MG ORAL TABLET SCH MG: 325 PILL ORAL at 08:23

## 2022-08-05 RX ADMIN — PREGABALIN SCH MG: 75 CAPSULE ORAL at 08:23

## 2022-08-05 RX ADMIN — CEFAZOLIN SCH: 330 INJECTION, POWDER, FOR SOLUTION INTRAMUSCULAR; INTRAVENOUS at 01:58

## 2022-08-05 NOTE — P.DS
Providers


Date of admission: 


08/03/22 07:30





Expected date of discharge: 08/05/22


Attending physician: 


Miki Bucio





Consults: 





                                        





08/02/22 20:19


Consult Physician Urgent 


   Consulting Provider: Jun Martinez


   Consult Reason/Comments: medical management


   Do you want consulting provider notified?: Yes











Primary care physician: 


Tracy Linn








- Discharge Diagnosis(es)


(1) Hip dislocation, right


Current Visit: Yes   Status: Acute   





(2) Osteoarthritis of right hip


Current Visit: No   Status: Acute   


Hospital Course: 





This patient is a 67-year-old female with past medical history rheumatoid 

arthritis, fibromyalgia who presented to [facility] emergency department

On 8/3/2022 after transfer from [facility] in [place], [place] for right hip

dislocation. The patient is status-post direct anterior right total hip 

arthroplasty in August 2020 with Dr. Bucio. Patient states she was in her 

kitchen yesterday and slipped on a wet floor, landing directly onto the right 

hip.  She experienced immediate pain and inability to get up.  Her  

called EMS, who transported the patient to [facility] emergency department.

X-rays in the emergency department revealed a dislocated right total hip.  There

were 2 unsuccessful attempts at closed reduction by the ER physician, as well as

an on-call orthopedic surgeon.  The patient was transferred to [facility] emergency department for further evaluation by Dr. Bucio. Patient is 

admitted under the care of Dr. Bucio with a consulted placed to internal 

medicine. 


The patient was found to have a hip dislocation.  She was taken to the operating

room on 8/3/2022 for attempted closed reduction which was unsuccessful.  Open 

reduction was performed.  Postreduction x-rays showed that the hip was in good 

position and alignment.  The patient did well postoperatively. She did have 

increased drainage to her incision.  A compressive dressing was placed today.  

She may be discharged to home today in stable condition.


Please see med rec for accurate list of home medications.





Patient Condition at Discharge: Stable





Plan - Discharge Summary


Discharge Rx Participant: No


New Discharge Prescriptions: 


New


   Aspirin 325 mg PO BID #60 tab


   HYDROcodone/APAP 5-325MG [Norco 5] 1 each PO Q6HR PRN #28 tab


     PRN Reason: Pain


   Sennosides-Docusate Sodium [Senokot-S] 1 tab PO BID PRN #60 tablet


     PRN Reason: Constipation


   Ondansetron [Zofran] 4 mg PO Q6HR PRN #30 tab


     PRN Reason: Nausea





No Action


   Fluticasone Nasal Spray [Flonase Nasal Spray] 1 spray EA NOSTRIL DAILY PRN


     PRN Reason: allergies


   Albuterol Inhaler [Ventolin Hfa Inhaler] 2 puff INHALATION RT-Q6H PRN


     PRN Reason: Shortness Of Breath


   Meloxicam [Mobic] 7.5 mg PO BID


   Leflunomide [Arava] 20 mg PO Q48H


   Sertraline HCl [Zoloft] 100 mg PO DAILY


   Pregabalin [Lyrica] 150 mg PO TID


   Cholecalciferol [Vitamin D3 (25 Mcg = 1000 Iu)] 25 mcg PO TID


   Esomeprazole Magnesium [NexIUM 24Hr] 20 mg PO DAILY


   Loratadine-Pseudoeph 5-120 mg [Claritin-D 12 Hour] 1 tab PO Q12HR PRN


     PRN Reason: Allergy Symptoms


Discharge Medication List





Albuterol Inhaler [Ventolin Hfa Inhaler] 2 puff INHALATION RT-Q6H PRN 08/12/20 

[History]


Fluticasone Nasal Spray [Flonase Nasal Spray] 1 spray EA NOSTRIL DAILY PRN 

08/12/20 [History]


Leflunomide [Arava] 20 mg PO Q48H 08/12/20 [History]


Meloxicam [Mobic] 7.5 mg PO BID 08/12/20 [History]


Sertraline HCl [Zoloft] 100 mg PO DAILY 08/12/20 [History]


Cholecalciferol [Vitamin D3 (25 Mcg = 1000 Iu)] 25 mcg PO TID 08/02/22 [History]


Esomeprazole Magnesium [NexIUM 24Hr] 20 mg PO DAILY 08/02/22 [History]


Loratadine-Pseudoeph 5-120 mg [Claritin-D 12 Hour] 1 tab PO Q12HR PRN 08/02/22 

[History]


Pregabalin [Lyrica] 150 mg PO TID 08/02/22 [History]


Aspirin 325 mg PO BID #60 tab 08/03/22 [Rx]


HYDROcodone/APAP 5-325MG [Norco 5] 1 each PO Q6HR PRN #28 tab 08/03/22 [Rx]


Ondansetron [Zofran] 4 mg PO Q6HR PRN #30 tab 08/03/22 [Rx]


Sennosides-Docusate Sodium [Senokot-S] 1 tab PO BID PRN #60 tablet 08/03/22 [Rx]








Follow up Appointment(s)/Referral(s): 


Giovanni Duque, HERLINDA [PHYSICIAN ASSISTANT] - 08/08/22 10:30 am (Incision check 

with ANJUM Davila on Monday. )


Tracy Linn DO [Primary Care Provider] - 08/09/22 11:50 am


Miki Bucio DO [Doctor of Osteopathic Medicine] - 08/19/22 1:15 pm


(Patient may follow-up with Dr. Miki Bucio at [facility] in 2 weeks following discharge.


)


Activity/Diet/Wound Care/Special Instructions: 


1.  Weight-bear as tolerated on right lower extremity


2.  May utilize walker to aid in ambulation as needed


3.  No specific hip precautions


4.  Take medications as prescribed


5.  Keep dressing over the right hip clean, dry, and intact over the next 7 days


6.  Patient may shower with dressing over the right hip intact


7.  Any questions or concerns please contact [facility]

at [phone]


8.  No outpatient physical therapy at this time


9.  Follow-up with Dr. Bucio on Monday to check incision.


Discharge Disposition: HOME SELF-CARE

## 2022-08-05 NOTE — P.PN
Subjective


Progress Note Date: 08/05/22


67-year-old female with a history of lumbar arthritis came in after a mechanical

fall and had right hip patient had right hip arthroplasty about any ago.  

Patient does have pain.  Patient does have history of rheumatoid arthritis and 

patient denied any coronary artery disease patient is not a smoker.  Patient 

denied any history of can start failure and her functionality is good.





08/04/2022


Patient is postoperative open reduction of right hip. She reports no pain today,

she is pending evaluation by physical therapy and states plan is for discharge 

tomorrow. She used CPAP last night and did require oxygen via nasal cannula 

states her sats were at 84%. She is weaned off oxygen today and is on room air 

at the time of my exam. She does have some scatter coarse rhonci more in the 

right base, no wheezing. She states she was treated for bronchitis and UTI 

outpatient. She states she was at the tail end of having bronchitis. She has 

ventolin nebulizer on board which we will recommend to receive today and 

overnight. Was also supposed to have follow up at primary care office for repeat

urinalysis which we will defer as she reports no dysuria, no urgency, and no 

urinary frequency. She also requesting claritin. No white count. Remains 

afebrile. 





08/05/2022


Patient evaluated today she is post op open reduction right hip. She does have 

incisional leakage and dressing was changed twice per nurse. She will be 

discharged home today and will follow up in the office with orthopedics on 

Monday. Labs reviewed today showing sodium 145, potassium 4.4, Bun 20, 

creatinine 0.9, glucose 150. Recommend follow up A1C outpatient. Stop mobic and 

patient is started on aspirin 325 BID by primary. Rufina for bowel regimen while 

taking norco. Patient has an appointment with primary care on Tuesday.





Review of Systems





Constitutional: Denied any fatigue denied any fever.


Cardio vascular: denied any chest pain, palpitations


Gastrointestinal: denied any nausea, vomiting, diarrhea


Pulmonary: Denied any shortness of breath cough She does have post nasal drip.


Neurologic denied any new focal deficits





All inpatient medications were reviewed and appropriate changes in these 

medications as dictated in the interval history and assessment and plan.





REVIEW OF SYSTEMS: 


CONSTITUTIONAL: No fever, no malaise, no fatigue. 


HEENT: No recent visual problems or hearing problems. Denied any sore throat. 


CARDIOVASCULAR: No chest pain, orthopnea, PND, no palpitations, no syncope. 


PULMONARY: No shortness of breath, no cough, no hemoptysis. 


GASTROINTESTINAL: No diarrhea, no nausea, no vomiting, no abdominal pain. 


NEUROLOGICAL: No headaches, no weakness, no numbness. 


HEMATOLOGICAL: Denies any bleeding or petechiae. 


GENITOURINARY: Denies any burning micturition, frequency, or urgency. 


MUSCULOSKELETAL/RHEUMATOLOGICAL: Pain in the right hip 


ENDOCRINE: Denies any polyuria or polydipsia. 





The rest of the 14-point review of systems is negative.





PHYSICAL EXAMINATION: 





GENERAL: The patient is alert and oriented x3, not in any acute distress. Well 

developed, well nourished. 


HEENT: Pupils are round and equally reacting to light. EOMI. No scleral icterus.

No conjunctival pallor. Normocephalic, atraumatic. No pharyngeal erythema. No 

thyromegaly. 


CARDIOVASCULAR: S1 and S2 present. No murmurs, rubs, or gallops. 


PULMONARY: Faint scattered rhonchi more right base than left 


ABDOMEN: Soft, nontender, nondistended, normoactive bowel sounds. No palpable 

organomegaly. 


MUSCULOSKELETAL: Post surgical dressing in place.


EXTREMITIES: No cyanosis, clubbing, or pedal edema. 


NEUROLOGICAL: Gross neurological examination did not reveal any focal deficits. 


SKIN: No rashes. 





Assessment and plan


-Postoperative day #2 open reduction for right hip dislocation


-Rheumatoid arthritis can resume Arava


-Depression 


-Gastroesophageal reflux disease


-Recent treatment for bronchitis continue on ventolin nebulizer and continue to 

encourage incentive spirometer





DVT prophylaxis: As per primary service


GI Prophylaxis: Protonix





Full Code


Medically patient is cleared for discharge


Thank you for this consultation 





The impression and plan of care has been dictated by Elaine Ponce, Nurse 

Practitioner as directed.





Dr. Richard MD


I have performed a history and physical examination and medical decision making 

of this patient, discussed the same with the dictator, and agree with the 

dictators assessment and plan as written, documented as a scribe. Based on total

visit time, I have performed more than 50% of this visit. 











Objective





- Vital Signs


Vital signs: 


                                   Vital Signs











Temp  97.9 F   08/05/22 07:59


 


Pulse  80   08/05/22 07:59


 


Resp  17   08/05/22 07:59


 


BP  138/77   08/05/22 07:59


 


Pulse Ox  94 L  08/05/22 08:33


 


FiO2  44   08/03/22 22:20








                                 Intake & Output











 08/04/22 08/05/22 08/05/22





 18:59 06:59 18:59


 


Intake Total 2000  


 


Balance 2000  


 


Intake:   


 


  Oral 2000  


 


Other:   


 


  Voiding Method  Toilet 


 


  # Voids 3 4 














- Labs


CBC & Chem 7: 


                                 08/04/22 07:45





                                 08/05/22 07:05


Labs: 


                  Abnormal Lab Results - Last 24 Hours (Table)











  08/04/22 Range/Units





  07:45 


 


MCHC  31.6 L  (32.0-37.0)  g/dL


 


Lymphocytes #  0.75 L  (0.90-5.00)  X 10*3/uL














Assessment and Plan


Time with Patient: Less than 30

## 2022-09-18 ENCOUNTER — HOSPITAL ENCOUNTER (INPATIENT)
Dept: HOSPITAL 47 - EC | Age: 67
LOS: 5 days | Discharge: NURSING, REHAB OR LONG-TERM CARE | DRG: 467 | End: 2022-09-23
Attending: ORTHOPAEDIC SURGERY | Admitting: ORTHOPAEDIC SURGERY
Payer: MEDICARE

## 2022-09-18 DIAGNOSIS — F32.A: ICD-10-CM

## 2022-09-18 DIAGNOSIS — Z88.0: ICD-10-CM

## 2022-09-18 DIAGNOSIS — Z91.81: ICD-10-CM

## 2022-09-18 DIAGNOSIS — I10: ICD-10-CM

## 2022-09-18 DIAGNOSIS — M24.452: ICD-10-CM

## 2022-09-18 DIAGNOSIS — T84.020A: Primary | ICD-10-CM

## 2022-09-18 DIAGNOSIS — L02.414: ICD-10-CM

## 2022-09-18 DIAGNOSIS — Z88.2: ICD-10-CM

## 2022-09-18 DIAGNOSIS — J30.2: ICD-10-CM

## 2022-09-18 DIAGNOSIS — Z88.8: ICD-10-CM

## 2022-09-18 DIAGNOSIS — K21.9: ICD-10-CM

## 2022-09-18 DIAGNOSIS — Z79.82: ICD-10-CM
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
## 2022-09-18 DIAGNOSIS — Z88.1: ICD-10-CM

## 2022-09-18 DIAGNOSIS — M79.7: ICD-10-CM

## 2022-09-18 DIAGNOSIS — Z87.891: ICD-10-CM

## 2022-09-18 DIAGNOSIS — G47.33: ICD-10-CM

## 2022-09-18 DIAGNOSIS — Z79.899: ICD-10-CM

## 2022-09-18 DIAGNOSIS — M06.9: ICD-10-CM

## 2022-09-18 DIAGNOSIS — Z96.653: ICD-10-CM

## 2022-09-18 DIAGNOSIS — Z82.49: ICD-10-CM

## 2022-09-18 DIAGNOSIS — J98.11: ICD-10-CM

## 2022-09-18 DIAGNOSIS — Z20.822: ICD-10-CM

## 2022-09-18 LAB
ALBUMIN SERPL-MCNC: 3.5 G/DL (ref 3.5–5)
ALP SERPL-CCNC: 122 U/L (ref 38–126)
ALT SERPL-CCNC: 18 U/L (ref 4–34)
ANION GAP SERPL CALC-SCNC: 7 MMOL/L
AST SERPL-CCNC: 31 U/L (ref 14–36)
BASOPHILS # BLD AUTO: 0 K/UL (ref 0–0.2)
BASOPHILS NFR BLD AUTO: 1 %
BUN SERPL-SCNC: 18 MG/DL (ref 7–17)
CALCIUM SPEC-MCNC: 8.7 MG/DL (ref 8.4–10.2)
CHLORIDE SERPL-SCNC: 105 MMOL/L (ref 98–107)
CO2 SERPL-SCNC: 27 MMOL/L (ref 22–30)
EOSINOPHIL # BLD AUTO: 0.2 K/UL (ref 0–0.7)
EOSINOPHIL NFR BLD AUTO: 4 %
ERYTHROCYTE [DISTWIDTH] IN BLOOD BY AUTOMATED COUNT: 4.48 M/UL (ref 3.8–5.4)
ERYTHROCYTE [DISTWIDTH] IN BLOOD: 13.8 % (ref 11.5–15.5)
GLUCOSE SERPL-MCNC: 128 MG/DL (ref 74–99)
HCT VFR BLD AUTO: 40.8 % (ref 34–46)
HGB BLD-MCNC: 12.7 GM/DL (ref 11.4–16)
INR PPP: 1 (ref ?–1.2)
LYMPHOCYTES # SPEC AUTO: 1.4 K/UL (ref 1–4.8)
LYMPHOCYTES NFR SPEC AUTO: 26 %
MCH RBC QN AUTO: 28.3 PG (ref 25–35)
MCHC RBC AUTO-ENTMCNC: 31 G/DL (ref 31–37)
MCV RBC AUTO: 91.1 FL (ref 80–100)
MONOCYTES # BLD AUTO: 0.4 K/UL (ref 0–1)
MONOCYTES NFR BLD AUTO: 7 %
NEUTROPHILS # BLD AUTO: 3.2 K/UL (ref 1.3–7.7)
NEUTROPHILS NFR BLD AUTO: 60 %
PLATELET # BLD AUTO: 196 K/UL (ref 150–450)
POTASSIUM SERPL-SCNC: 4.1 MMOL/L (ref 3.5–5.1)
PROT SERPL-MCNC: 6 G/DL (ref 6.3–8.2)
PT BLD: 10.6 SEC (ref 9–12)
SODIUM SERPL-SCNC: 139 MMOL/L (ref 137–145)
WBC # BLD AUTO: 5.2 K/UL (ref 3.8–10.6)

## 2022-09-18 PROCEDURE — 85610 PROTHROMBIN TIME: CPT

## 2022-09-18 PROCEDURE — 86900 BLOOD TYPING SEROLOGIC ABO: CPT

## 2022-09-18 PROCEDURE — 72170 X-RAY EXAM OF PELVIS: CPT

## 2022-09-18 PROCEDURE — 85025 COMPLETE CBC W/AUTO DIFF WBC: CPT

## 2022-09-18 PROCEDURE — 81003 URINALYSIS AUTO W/O SCOPE: CPT

## 2022-09-18 PROCEDURE — 71045 X-RAY EXAM CHEST 1 VIEW: CPT

## 2022-09-18 PROCEDURE — 87635 SARS-COV-2 COVID-19 AMP PRB: CPT

## 2022-09-18 PROCEDURE — 80053 COMPREHEN METABOLIC PANEL: CPT

## 2022-09-18 PROCEDURE — 73501 X-RAY EXAM HIP UNI 1 VIEW: CPT

## 2022-09-18 PROCEDURE — 83605 ASSAY OF LACTIC ACID: CPT

## 2022-09-18 PROCEDURE — 86901 BLOOD TYPING SEROLOGIC RH(D): CPT

## 2022-09-18 PROCEDURE — 99285 EMERGENCY DEPT VISIT HI MDM: CPT

## 2022-09-18 PROCEDURE — 86850 RBC ANTIBODY SCREEN: CPT

## 2022-09-18 PROCEDURE — 70486 CT MAXILLOFACIAL W/O DYE: CPT

## 2022-09-18 PROCEDURE — 96374 THER/PROPH/DIAG INJ IV PUSH: CPT

## 2022-09-18 PROCEDURE — 87040 BLOOD CULTURE FOR BACTERIA: CPT

## 2022-09-18 RX ADMIN — LEFLUNOMIDE SCH MG: 20 TABLET ORAL at 09:51

## 2022-09-18 RX ADMIN — PREGABALIN SCH MG: 75 CAPSULE ORAL at 21:55

## 2022-09-18 RX ADMIN — MORPHINE SULFATE PRN MG: 4 INJECTION, SOLUTION INTRAMUSCULAR; INTRAVENOUS at 07:31

## 2022-09-18 RX ADMIN — HEPARIN SODIUM SCH UNIT: 5000 INJECTION INTRAVENOUS; SUBCUTANEOUS at 17:13

## 2022-09-18 RX ADMIN — CEFAZOLIN SCH MLS/HR: 330 INJECTION, POWDER, FOR SOLUTION INTRAMUSCULAR; INTRAVENOUS at 07:31

## 2022-09-18 RX ADMIN — Medication SCH MCG: at 21:55

## 2022-09-18 RX ADMIN — PREGABALIN SCH MG: 75 CAPSULE ORAL at 17:48

## 2022-09-18 RX ADMIN — Medication SCH MCG: at 17:13

## 2022-09-18 RX ADMIN — HEPARIN SODIUM SCH UNIT: 5000 INJECTION INTRAVENOUS; SUBCUTANEOUS at 23:07

## 2022-09-18 RX ADMIN — Medication SCH MCG: at 17:49

## 2022-09-18 RX ADMIN — MORPHINE SULFATE PRN MG: 4 INJECTION, SOLUTION INTRAMUSCULAR; INTRAVENOUS at 19:50

## 2022-09-18 RX ADMIN — PREGABALIN SCH MG: 75 CAPSULE ORAL at 17:13

## 2022-09-18 NOTE — P.CONS
History of Present Illness





- Reason for Consult


Consult date: 22





- History of Present Illness





Patient is a 67-year-old female with PMH of hypertension, depression, 

neuropathic pain, rheumatoid arthritis, GERD, obstructive sleep apnea presents 

ED for sudden onset right hip pain.  Patient reports undergoing hip surgery with

Dr. Bucio 8 weeks ago.  Since then, she has experienced multiple dislocations

requiring surgical intervention.  She reports restricted range of motion in her 

right hip along with pain, 10 out of 10 in severity.  She reports the pain to be

sharp and stabbing in nature.  This prompted her to come to the ED.  She has 

been admitted under orthopedic surgery with [facility] on consult.  

Patient denies any headache, lower extremity edema, nausea or vomiting, fever 

chills, cough, chest pain, shortness of breath, palpitations, changes in 

urination or bowel habits.  No changes in appetite or weight.  She denies any 

dizziness.  Pelvic x-ray in the ED showed dislocated right hip prosthesis.





She reports no history of CAD or CVA.





Review of systems is being performed and is negative except above.





Her vital signs are stable.





General: [non toxic], [no distress], [appears at stated age]


Derm: [warm], [dry]


Head: [atraumatic], [normocephalic], [symmetric]


Eyes: [EOMI], [no lid lag], [anicteric sclera]


Mouth: [no lip lesion], [mucus membranes moist]


Cardiovascular: [S1S2 reg], [no murmur], [positive DP pulse bilateral], 


Lungs: [CTA bilateral], [no rhonchi, no rales] , [no accessory muscle use]


Abdominal: [soft], [ nontender to palpation], [no guarding], [no appreciable 

organomegaly]


Ext: [no gross muscle atrophy], [no edema], [restricted range of motion of the 

right hip due to pain]


Neuro: [no focal neuro deficits]


Psych: [Alert], [oriented], [appropriate affect] 





#Hypertension


#Depression


#Rheumatoid arthritis


#Neuropathic pain


#GERD


#Obstructive sleep apnea


#Right hip dislocation





Patient restarted on lisinopril 5 mg by mouth daily.  Monitor vitals, adjust 

medication if necessary.





Restart sertraline.





Restart leflunomide.





Restart Lyrica.





Restart Nexium.





CPAP at bedtime.





Orthopedic surgery has been consulted for management of right hip dislocation.  

Pain management deferred to orthopedic surgery.  PT and OT will be consulted to 

work with this patient.  Patient be placed on fall precautions.





DVT prophylaxis: [Heparin]


Discussed with: [Patient]


Anticipated discharge: [2-3 days]


Anticipated discharge place: [Home]


A total of [35] minutes was spent on the care of this complex patient more than 

50% of the time was spent in counseling and care coordination. 





Patient is her  decision maker if she can't make decisions for herself.  

Patient would like to be full code.





Past Medical History


Past Medical History: Fibromyalgia, GERD/Reflux, Osteoarthritis (OA), Rheumatoid

Arthritis (RA), Sleep Apnea/CPAP/BIPAP


Additional Past Medical History / Comment(s): seasonal allergies, has 

cpap,lymphdema rt arm


History of Any Multi-Drug Resistant Organisms: None Reported


Past Surgical History: Cholecystectomy, Hysterectomy, Joint Replacement, 

Orthopedic Surgery, Tonsillectomy


Additional Past Surgical History / Comment(s): 2 large lipomas removed rt 

axillae, 3 total knee lt x1,rt knee x2 replacement,lipoma removed from back,keith 

carpal tunnel,tendon release middle finger and thumbs keith., keith thumb joints


Past Anesthesia/Blood Transfusion Reactions: No Reported Reaction


Past Psychological History: Depression


Smoking Status: Former smoker


Past Alcohol Use History: Rare


Past Drug Use History: None Reported





- Past Family History


  ** Sister(s)


Family Medical History: Cancer


Additional Family Medical History / Comment(s): skin cancer





  ** Brother(s)


Family Medical History: Cancer


Additional Family Medical History / Comment(s): skin cancer





  ** Father


Family Medical History: Myocardial Infarction (MI)


Additional Family Medical History / Comment(s): Father  of a MI at the age 

of 65yrs.





  ** Mother


Family Medical History: No Reported History


Additional Family Medical History / Comment(s): Mother was healthy and lived to 

be 96yrs old.





Medications and Allergies


                                Home Medications











 Medication  Instructions  Recorded  Confirmed  Type


 


Albuterol Inhaler [Ventolin Hfa 2 puff INHALATION RT-Q6H PRN 20 

History





Inhaler]    


 


Fluticasone Nasal Spray [Flonase 1 spray EA NOSTRIL DAILY PRN 20 

History





Nasal Spray]    


 


Leflunomide [Arava] 20 mg PO Q48H 20 History


 


Sertraline HCl [Zoloft] 100 mg PO DAILY 20 History


 


Cholecalciferol [Vitamin D3 (25 25 mcg PO TID 22 History





Mcg = 1000 Iu)]    


 


Esomeprazole Magnesium [NexIUM 20 mg PO DAILY 22 History





24Hr]    


 


Pregabalin [Lyrica] 150 mg PO TID 22 History


 


Aspirin 325 mg PO BID #60 tab 22  Rx


 


HYDROcodone/APAP 5-325MG [Norco 5] 1 each PO Q6HR PRN #28 tab 22  Rx


 


Ondansetron [Zofran] 4 mg PO Q6HR PRN #30 tab 22  Rx


 


Sennosides-Docusate Sodium 1 tab PO BID PRN #60 tablet 22  Rx





[Senokot-S]    








                                    Allergies











Allergy/AdvReac Type Severity Reaction Status Date / Time


 


amoxicillin [From Augmentin] Allergy  Nausea Verified 22 17:27


 


aripiprazole [From Abilify] Allergy  Unknown Verified 22 17:27


 


clavulanic acid Allergy  Nausea Verified 22 17:27





[From Augmentin]     


 


levofloxacin [From Levaquin] Allergy  Unknown Verified 22 17:27


 


methotrexate Allergy  ringing in Verified 22 17:27





   ears  


 


Sulfa (Sulfonamide Allergy  Rash/Hives, Verified 22 17:27





Antibiotics)   itchy  














Physical Exam


Vitals: 


                                   Vital Signs











  Temp Pulse Resp BP Pulse Ox


 


 22 07:30   75  16  127/69  99


 


 22 04:29  98 F  63  18  132/62  98








                                Intake and Output











 22





 22:59 06:59 14:59


 


Other:   


 


  Weight  81.647 kg

## 2022-09-18 NOTE — XR
EXAMINATION TYPE: XR pelvis AP view

 

DATE OF EXAM: 9/18/2022

 

COMPARISON: NONE

 

HISTORY: Hip dislocation

 

TECHNIQUE: 2 views

 

FINDINGS: There is right hip prosthesis. There is a lateral superior dislocation of the right femoral
 head. The pelvic ring is intact. Proximal left femur and hip joint are intact. There is left-sided a
cetabular spurring.

 

IMPRESSION: Dislocated right hip prosthesis.

## 2022-09-18 NOTE — P.HPOR
History of Present Illness


H&P Date: 22


This patient is a 67- year old female who presented to an outside hospital 

yesterday via EMS after a fall at home. Patient states she was standing at the 

stove and turned quickly, and felt a pop in her hip. EMS was called and she was 

transported to an ED in [place]. X-rays in the emergency department revealed a 

dislocated right hip prosthesis. Attempts were made at reduction which were 

unsuccessful. Patient was transferred to [facility] for further 

management. Patient was admitted under the care of Dr. Bucio with a consult 

placed to internal medicine. Patient has a history of right hip dislocation 

about 6 weeks ago that required an open reduction by Dr. Bucio in the 

operating room. Patient is status-post direct anterior right total hip 

arthroplasty by Dr. Bucio in 2020.


Patient is examined bedside in the emergency department this morning. Patient 

states she recently saw Dr. Bucio in the office about a week ago. She states 

her pain is well controlled at this time. She did not hit her head when she 

fell. She denies numbness or tingling of the right lower extremity. No 

additional complaints. Vital signs stable.








Past Medical History


Past Medical History: Fibromyalgia, GERD/Reflux, Osteoarthritis (OA), Rheumatoid

Arthritis (RA), Sleep Apnea/CPAP/BIPAP


Additional Past Medical History / Comment(s): seasonal allergies, has 

cpap,lymphdema rt arm


History of Any Multi-Drug Resistant Organisms: None Reported


Past Surgical History: Cholecystectomy, Hysterectomy, Joint Replacement, 

Orthopedic Surgery, Tonsillectomy


Additional Past Surgical History / Comment(s): 2 large lipomas removed rt 

axillae, 3 total knee lt x1,rt knee x2 replacement,lipoma removed from back,keith 

carpal tunnel,tendon release middle finger and thumbs keith., keith thumb joints


Past Anesthesia/Blood Transfusion Reactions: No Reported Reaction


Past Psychological History: Depression


Smoking Status: Former smoker


Past Alcohol Use History: Rare


Past Drug Use History: None Reported





- Past Family History


  ** Sister(s)


Family Medical History: Cancer


Additional Family Medical History / Comment(s): skin cancer





  ** Brother(s)


Family Medical History: Cancer


Additional Family Medical History / Comment(s): skin cancer





  ** Father


Family Medical History: Myocardial Infarction (MI)


Additional Family Medical History / Comment(s): Father  of a MI at the age 

of 65yrs.





  ** Mother


Family Medical History: No Reported History


Additional Family Medical History / Comment(s): Mother was healthy and lived to 

be 96yrs old.





Medications and Allergies


                                Home Medications











 Medication  Instructions  Recorded  Confirmed  Type


 


Albuterol Inhaler [Ventolin Hfa 2 puff INHALATION RT-Q6H PRN 20 

History





Inhaler]    


 


Fluticasone Nasal Spray [Flonase 1 spray EA NOSTRIL DAILY PRN 20 

History





Nasal Spray]    


 


Leflunomide [Arava] 20 mg PO Q48H 20 History


 


Sertraline HCl [Zoloft] 100 mg PO DAILY 20 History


 


Cholecalciferol [Vitamin D3 (25 25 mcg PO TID 22 History





Mcg = 1000 Iu)]    


 


Esomeprazole Magnesium [NexIUM 20 mg PO DAILY 22 History





24Hr]    


 


Pregabalin [Lyrica] 150 mg PO TID 22 History


 


Aspirin 325 mg PO BID #60 tab 22  Rx


 


HYDROcodone/APAP 5-325MG [Norco 5] 1 each PO Q6HR PRN #28 tab 22  Rx


 


Ondansetron [Zofran] 4 mg PO Q6HR PRN #30 tab 22  Rx


 


Sennosides-Docusate Sodium 1 tab PO BID PRN #60 tablet 22  Rx





[Senokot-S]    








                                    Allergies











Allergy/AdvReac Type Severity Reaction Status Date / Time


 


amoxicillin [From Augmentin] Allergy  Nausea Verified 22 17:27


 


aripiprazole [From Abilify] Allergy  Unknown Verified 22 17:27


 


clavulanic acid Allergy  Nausea Verified 22 17:27





[From Augmentin]     


 


levofloxacin [From Levaquin] Allergy  Unknown Verified 22 17:27


 


methotrexate Allergy  ringing in Verified 22 17:27





   ears  


 


Sulfa (Sulfonamide Allergy  Rash/Hives, Verified 22 17:27





Antibiotics)   itchy  














Physical Examination


On examination, patient is sitting up in the bed in no apparent distress. She is

alert and orientated x3. Her head appears normocephalic and atraumatic. Her 

breathing appears non-labored. On inspection of her bilateral upper extremities,

there are no obvious deformities or signs of trauma. On inspection of her left 

lower extremity, no obvious deformities or signs of trauma. 


On inspection of her right hip, there is a healing incision at the anterior hip.

No drainage, no erythema. The right lower extremity is shortened. Motor and 

sensory function intact RLE. Right dorsalis pedis pulse easily palpable. Calf 

nontender. 








Results


Right hip and pelvis x-ray 22: Dislocated right hip prosthesis 








Assessment and Plan


Assessment: 


Recurrent right hip dislocation 


 Status-post direct anterior right total hip arthroplasty 2020 with Dr. Bucio





Plan: 


- The clinical and imaging findings were discussed with the patient. The patient

was discussed with Dr. Bucio. Revision right total hip arthroplasty is 

recommended for for recurrent right hip dislocation. Surgery will be planned for

tomorrow, pending medical clearance and consent. 


- Bed rest. 


- Pain management as needed. 


- Internal medicine consulted for pre-operative medical evaluation. 


- NPO diet at midnight.

## 2022-09-19 RX ADMIN — PANTOPRAZOLE SODIUM SCH MG: 40 TABLET, DELAYED RELEASE ORAL at 09:57

## 2022-09-19 RX ADMIN — HEPARIN SODIUM SCH UNIT: 5000 INJECTION INTRAVENOUS; SUBCUTANEOUS at 23:57

## 2022-09-19 RX ADMIN — Medication SCH MCG: at 09:57

## 2022-09-19 RX ADMIN — PREGABALIN SCH MG: 75 CAPSULE ORAL at 21:43

## 2022-09-19 RX ADMIN — CEFAZOLIN SCH MLS/HR: 330 INJECTION, POWDER, FOR SOLUTION INTRAMUSCULAR; INTRAVENOUS at 03:29

## 2022-09-19 RX ADMIN — MORPHINE SULFATE PRN MG: 4 INJECTION, SOLUTION INTRAMUSCULAR; INTRAVENOUS at 19:12

## 2022-09-19 RX ADMIN — SERTRALINE HYDROCHLORIDE SCH MG: 100 TABLET ORAL at 09:50

## 2022-09-19 RX ADMIN — HEPARIN SODIUM SCH UNIT: 5000 INJECTION INTRAVENOUS; SUBCUTANEOUS at 09:49

## 2022-09-19 RX ADMIN — Medication SCH MCG: at 21:43

## 2022-09-19 RX ADMIN — PREGABALIN SCH MG: 75 CAPSULE ORAL at 16:13

## 2022-09-19 RX ADMIN — Medication SCH MCG: at 16:13

## 2022-09-19 RX ADMIN — PREGABALIN SCH MG: 75 CAPSULE ORAL at 09:50

## 2022-09-19 RX ADMIN — MORPHINE SULFATE PRN MG: 4 INJECTION, SOLUTION INTRAMUSCULAR; INTRAVENOUS at 03:28

## 2022-09-19 RX ADMIN — MORPHINE SULFATE PRN MG: 4 INJECTION, SOLUTION INTRAMUSCULAR; INTRAVENOUS at 09:50

## 2022-09-19 RX ADMIN — HEPARIN SODIUM SCH UNIT: 5000 INJECTION INTRAVENOUS; SUBCUTANEOUS at 16:13

## 2022-09-19 NOTE — P.PN
Subjective


Progress Note Date: 09/19/22


This is a 67-year-old female is admitted for recurrent dislocation of her right 

total hip.  Patient is seen and evaluated at bedside today with Dr. Miki Bucio.  Patient states that her pain is well controlled and she denies any 

new complaints today.








Objective





- Vital Signs


Vital signs: 


                                   Vital Signs











Temp  98.7 F   09/19/22 11:06


 


Pulse  69   09/19/22 11:06


 


Resp  18   09/19/22 11:06


 


BP  102/63   09/19/22 11:06


 


Pulse Ox  96   09/19/22 11:06


 


FiO2      








                                 Intake & Output











 09/18/22 09/19/22 09/19/22





 18:59 06:59 18:59


 


Intake Total  100 


 


Output Total  1350 


 


Balance  -1250 


 


Weight 81.647 kg  


 


Intake:   


 


  Oral  100 


 


Output:   


 


  Urine  1350 


 


Other:   


 


  Voiding Method  Indwelling Catheter 














- Exam


On exam patient is resting comfortably in bed in no acute distress.  Patient is 

alert and oriented 3.  There is mild swelling about the right hip. Calf is soft

and nontender to palpation. Sensation intact. Neurovascular status and 

circulatory status are intact.








- Labs


CBC & Chem 7: 


                                 09/18/22 12:20





                                 09/18/22 12:20


Labs: 


                  Abnormal Lab Results - Last 24 Hours (Table)











  09/18/22 Range/Units





  12:20 


 


BUN  18 H  (7-17)  mg/dL


 


Glucose  128 H  (74-99)  mg/dL


 


Total Protein  6.0 L  (6.3-8.2)  g/dL














Assessment and Plan


(1) Recurrent dislocation, right hip


Current Visit: Yes   Status: Acute   Code(s): M24.451 - RECURRENT DISLOCATION, 

RIGHT HIP   SNOMED Code(s): 286250589


   





(2) History of total right hip arthroplasty


Current Visit: Yes   Status: Acute   Code(s): Z96.641 - PRESENCE OF RIGHT ART

IFICIAL HIP JOINT   SNOMED Code(s): 588647678835


   


Plan: 


1. Nothing by mouth after midnight.


2. Planning for revision right total hip arthroplasty on 9/20/2022 pending 

medical clearance and patient consent.

## 2022-09-19 NOTE — P.PN
Subjective


Progress Note Date: 09/19/22





Patient was seen and examined.  No acute events overnight.  Patient reports 

well-controlled pain in her right hip.  She has no other complaints.





General: non toxic, no distress, appears at stated age


Derm: warm, dry


Head: atraumatic, normocephalic, symmetric


Eyes: EOMI, no lid lag, anicteric sclera


Mouth: no lip lesion, mucus membranes moist


Cardiovascular: S1S2 reg, no murmur, positive DP pulse bilateral, 


Lungs: CTA bilateral, no rhonchi, no rales , no accessory muscle use


Ext: no gross muscle atrophy, no edema, restricted range of motion of the right 

hip due to pain


Neuro: no focal neuro deficits


Psych: Alert, oriented, appropriate affect 





#Hypertension


#Depression


#Rheumatoid arthritis


#Neuropathic pain


#GERD


#Obstructive sleep apnea


#Right hip dislocation





Patient restarted on lisinopril 5 mg by mouth daily.  Monitor vitals, adjust 

medication if necessary.





Restart sertraline.





Restart leflunomide.





Restart Lyrica.





Restart Nexium.





CPAP at bedtime.





Orthopedic surgery has been consulted for management of right hip dislocation.  

Pain management deferred to orthopedic surgery.  PT and OT will be consulted to 

work with this patient.  Patient be placed on fall precautions. Plans for OR 

tomorrow.





Patient is medically cleared for surgery.





DVT prophylaxis: Heparin


Discussed with: Patient


Anticipated discharge: 2-3 days





Objective





- Vital Signs


Vital signs: 


                                   Vital Signs











Temp  98.4 F   09/19/22 02:00


 


Pulse  74   09/19/22 02:00


 


Resp  20   09/19/22 02:00


 


BP  121/55   09/19/22 02:00


 


Pulse Ox  94 L  09/19/22 02:00


 


FiO2      








                                 Intake & Output











 09/18/22 09/19/22 09/19/22





 18:59 06:59 18:59


 


Intake Total  100 


 


Output Total  1350 


 


Balance  -1250 


 


Weight 81.647 kg  


 


Intake:   


 


  Oral  100 


 


Output:   


 


  Urine  1350 


 


Other:   


 


  Voiding Method  Indwelling Catheter 














- Labs


CBC & Chem 7: 


                                 09/18/22 12:20





                                 09/18/22 12:20


Labs: 


                  Abnormal Lab Results - Last 24 Hours (Table)











  09/18/22 Range/Units





  12:20 


 


BUN  18 H  (7-17)  mg/dL


 


Glucose  128 H  (74-99)  mg/dL


 


Total Protein  6.0 L  (6.3-8.2)  g/dL

## 2022-09-20 PROCEDURE — 0SP90JZ REMOVAL OF SYNTHETIC SUBSTITUTE FROM RIGHT HIP JOINT, OPEN APPROACH: ICD-10-PCS

## 2022-09-20 PROCEDURE — 0SR906Z REPLACEMENT OF RIGHT HIP JOINT WITH OXIDIZED ZIRCONIUM ON POLYETHYLENE SYNTHETIC SUBSTITUTE, OPEN APPROACH: ICD-10-PCS

## 2022-09-20 PROCEDURE — 3E0U029 INTRODUCTION OF OTHER ANTI-INFECTIVE INTO JOINTS, OPEN APPROACH: ICD-10-PCS

## 2022-09-20 RX ADMIN — PANTOPRAZOLE SODIUM SCH MG: 40 TABLET, DELAYED RELEASE ORAL at 08:49

## 2022-09-20 RX ADMIN — Medication SCH MCG: at 21:29

## 2022-09-20 RX ADMIN — ASPIRIN 325 MG ORAL TABLET SCH MG: 325 PILL ORAL at 21:29

## 2022-09-20 RX ADMIN — CEFAZOLIN SCH MLS/HR: 330 INJECTION, POWDER, FOR SOLUTION INTRAMUSCULAR; INTRAVENOUS at 21:30

## 2022-09-20 RX ADMIN — HEPARIN SODIUM SCH: 5000 INJECTION INTRAVENOUS; SUBCUTANEOUS at 21:20

## 2022-09-20 RX ADMIN — HEPARIN SODIUM SCH UNIT: 5000 INJECTION INTRAVENOUS; SUBCUTANEOUS at 08:49

## 2022-09-20 RX ADMIN — MORPHINE SULFATE PRN MG: 4 INJECTION, SOLUTION INTRAMUSCULAR; INTRAVENOUS at 19:50

## 2022-09-20 RX ADMIN — CEFAZOLIN SCH: 330 INJECTION, POWDER, FOR SOLUTION INTRAMUSCULAR; INTRAVENOUS at 06:03

## 2022-09-20 RX ADMIN — Medication SCH MCG: at 08:49

## 2022-09-20 RX ADMIN — Medication SCH MCG: at 12:51

## 2022-09-20 RX ADMIN — SERTRALINE HYDROCHLORIDE SCH MG: 100 TABLET ORAL at 08:49

## 2022-09-20 RX ADMIN — Medication SCH: at 21:21

## 2022-09-20 RX ADMIN — PREGABALIN SCH: 75 CAPSULE ORAL at 21:20

## 2022-09-20 RX ADMIN — MORPHINE SULFATE PRN MG: 4 INJECTION, SOLUTION INTRAMUSCULAR; INTRAVENOUS at 04:59

## 2022-09-20 RX ADMIN — PREGABALIN SCH MG: 75 CAPSULE ORAL at 08:48

## 2022-09-20 RX ADMIN — HEPARIN SODIUM SCH: 5000 INJECTION INTRAVENOUS; SUBCUTANEOUS at 08:50

## 2022-09-20 RX ADMIN — PREGABALIN SCH MG: 75 CAPSULE ORAL at 21:29

## 2022-09-20 RX ADMIN — MORPHINE SULFATE PRN MG: 4 INJECTION, SOLUTION INTRAMUSCULAR; INTRAVENOUS at 08:53

## 2022-09-20 RX ADMIN — LEFLUNOMIDE SCH MG: 20 TABLET ORAL at 08:49

## 2022-09-20 NOTE — P.PN
Subjective


Progress Note Date: 09/20/22





Patient denies any acute complaints this morning.  No acute issues overnight.  

She stated that she is scheduled for her surgery today.





Objective





- Vital Signs


Vital signs: 


                                   Vital Signs











Temp  98.0 F   09/20/22 14:04


 


Pulse  80   09/20/22 14:04


 


Resp  16   09/20/22 14:04


 


BP  142/65   09/20/22 14:04


 


Pulse Ox  91 L  09/20/22 14:04


 


FiO2      








                                 Intake & Output











 09/19/22 09/20/22 09/20/22





 18:59 06:59 18:59


 


Output Total 1000 1100 700


 


Balance -1000 -1100 -700


 


Output:   


 


  Urine 1000 1100 700


 


Other:   


 


  Voiding Method Indwelling Catheter Indwelling Catheter Indwelling Catheter














- Exam





General examination - Alert  and Oriented 3 in NAD


Heart - + S1S2  no murmurs


Lungs - Clear to auscultation


Abdomen soft NT ND +ve BS, obese


Extremities - No edema, restricted range of motion of the right lower extremity


CNS - Moving all 4 extremities spontaneously


Psych - Calm and cooperative





- Labs


CBC & Chem 7: 


                                 09/18/22 12:20





                                 09/18/22 12:20





Assessment and Plan


Assessment: 





Right hip dislocation


Patient scheduled for hip repair today





Hypertension


Resume lisinopril





Depression


Resume sertraline





Rheumatoid arthritis


Leflunomide





Neuropathic pain


lyrica





GERD


Resume Nexium





Obstructive sleep apnea


CPAP at bedtime





DVT prophylaxis: Heparin

## 2022-09-20 NOTE — P.OP
Date of Procedure: 09/20/22


Preoperative Diagnosis: 


Recurrent dislocation right total hip arthroplasty


Postoperative Diagnosis: 


Recurrent dislocation right total hip arthroplasty


Procedure(s) Performed: 


1.  Revision right total hip arthroplasty with conversion to a dual mobility 

liner for stability


2.  Stimulan antibiotic beads


3.  Application of 13 mm Prevena wound management system due to high greg of 

post operative wound complications.


Implants: 


Lucero [employer] OR30, 42 mm ID, 54 mm OD, Oxinium dual mobility liner


Smith [employer] OR30, 28 mm ID, 42 mm OD, XLPE Dual mobility insert


[employer]hew Oxinium femoral head 28 m, +8


The articulation is Oxinium on polyethylene


Stimulan antibiotic beads


.


Anesthesia: GETA


Surgeon: Miki Bucio


Assistant #1: Penelope Vazquez


Estimated Blood Loss (ml): 100


Pathology: none sent


Condition: stable


Disposition: PACU


Indications for Procedure: 


This is a 67-year-old female that had a right total arthroplasty performed 

approximately 2 years ago.  Approximately 6-8 weeks ago she experienced a 

dislocation while at home.  She had multiple attempts at closed reduction, until

finally an open reduction was performed.  Her hip was found to be stable at that

time.  She was doing well postoperatively and then experienced a second 

dislocation again while at home.  After discussing the surgical nonsurgical 

treatment options with her and her  at length, I recommended a revision 

of a right total hip arthroplasty with conversion to a dual mobility liner for 

stability.  I have explained to them how the dual mobility will increase 

stability as well as other options such as constrained liner.  I also discussed 

that we will place antibiotic beads secondary to multiple procedures in order to

lower the risk of infection.  I will also place a wound VAC dressing on her hip 

incision, again in order to lower the risk of infection and improve wound 

healing from her multiple surgeries.  They're agreeable to this informed consent

was obtained.


Operative Findings: 


The operative findings are consistent with a dislocation of a right total hip 

arthroplasty.  After conversion to a dual mobility construct, the hip was found 

to be stable throughout all range of motion.


Description of Procedure: 


Patient was seen and evaluated in the preoperative area and the consent was 

reviewed.  The operative site was marked with a skin marker.  The patient was 

then brought to the operating room and given preoperative antibiotics 

intravenously.  1 g of Tranexamic acid was also given intravenously.  A general 

anesthetic was administered by the anesthesia department.   The patient was then

placed on the [place] table with the bony prominences well-padded.  The hip area 

was then prepped with a ChloraPrep solution and draped in the usual sterile 

fashion.





A universal timeout was then performed, which confirmed the patient's name, 

surgical site, ALLERGIES, and procedure being performed on the consent.  Next 

the incision site was located at 1 cm distal and 2 cm lateral to the anterior 

superior iliac spine.  The skin and subcutaneous tissues were sharply incised 

with the prior incision being excised..  Incision was carefully dissected down 

to the fascia overlying the tensor fascia xena muscle.  This fascia was then 

incised in line with the incision.  Care was taken to stay laterally in order to

avoid injuring the lateral femoral cutaneous nerve.  Next, using blunt finger 

dissection, the tensor fascia xena muscle was dissected off its investing 

fascia.  The muscle was then carefully retracted laterally with a cobra 

retractor over the lateral neck of the femur.  Next, the circumflex vessels were

identified and cauterized using the AquaMantis device.  The proximal femurs 

readily visualized and exposed.  Using an osteotome and a mallet, the femoral 

head was removed from the trunnion of the femoral stem.  The femoral stem was 

inspected and found to be well fixed.





Attention was then turned to the acetabulum.  The acetabulum was exposed.  Using

the extraction device, acetabular liner was then removed from the acetabular 

shell.  Acetabular shell was inspected and found to be in good anatomic position

and well fixed.  Shell was then irrigated, and the dual mobility liner was 

impacted into the acetabulum with component locking being confirmed.  Next, the 

proximal femurs reexposed and a dual mobility trial was then placed.  The hip 

was reduced.  Fluoroscopic x-rays confirmed leg lengths and stability of the 

hip.  The hip was taken through a range of motion with the trial placed and 

found to be stable.  Hip was then redislocated and the trial heads removed.  Hip

was copiously irrigated with antibiotic solution and the final dual volarly head

and liner were impacted on the trunnion.  Hip was again reduced and again s

tability was inspected by taking the hip through range of motion and it was 

found to be stable.  Final fluoroscopic x-rays confirmed equal leg lengths and 

offset as well as placement of the prosthesis.





The hip was then copiously irrigated with antibiotic solution with pulsatile 

lavage.  The hip was then irrigated with Irrisept solution. 





On the back table the Stimulan antibiotic beads were prepared.  This was done by

mixing 1 g of vancomycin and 1.2 g of tobramycin with the solution.  This was 

then mixed, and made into small antibiotic beads which were allowed to harden.  

After they had hardened antibiotic beads were then placed into the incision 

around the hip and soft tissues.





The fascia was then closed with 2-0 strata fix suture.  The subcutaneous tissue 

was closed with 3-0 Vicryl.  The subcuticular tissue was closed with 3-0 strata 

fix suture.  A Prevena incision management dressing was applied to the wound.  

The decision to use this device because of multiple recent surgeries and the 

high risk of wound complications because of the site of incision and recent 

surgeries..  The patient was then transferred to the recovery room in stable 

condition.





The assistant  CHANDRAKANT Henriquez was required due to the complexity of surgery, 

and the need for skilled surgical assistant for positioning, draping, exposure, 

retraction, and closure of the wound.

## 2022-09-20 NOTE — XR
EXAMINATION TYPE: XR Hip Limited RT

 

DATE OF EXAM: 9/20/2022 4:46 PM

 

INDICATION: 

Patient age:Female;  67 years old; 

Reason for study: RIGHT ANTERIOR HIP REPLACEMENT; [facility]. 

 

COMPARISON: None.

 

Intraoperative/procedural fluoroscopic services were provided. Total fluoroscopy time is 20

2 seconds with a total of 6 submitted images to PACS. Please see the operative/procedural note for fu
rther details.

## 2022-09-20 NOTE — FL
Intraoperative/procedural fluoroscopic services were provided. Total fluoroscopy time is 28 seconds w
ith a total of 2 submitted images to PACS within x-ray hip Limited evaluation.. Please see the operat
veto/procedural note for further details.

## 2022-09-20 NOTE — XR
EXAMINATION TYPE: XR Hip Limited RT

 

DATE OF EXAM: 9/20/2022 5:23 PM

 

INDICATION: 

Patient age:Female;  67 years old; 

Reason for study: Status post hip surgery, assess surgical alignment; . 

 

COMPARISON: Multiple priors most recent pelvis 1922.

 

TECHNIQUE: The right hip was examined in the frontal projection.

 

FINDINGS: Post right hip arthroplasty changes. New tiny densities are seen within the joint capsule. 
No evidence of acute fracture. Hardware is in appropriate anatomic alignment.

 

 

IMPRESSION:

Total right hip arthroplasty status multiple densities within the joint capsule which may represent a
ntibiotic infused beads. Alignment is appropriate.

## 2022-09-21 LAB
BASOPHILS # BLD AUTO: 0.03 X 10*3/UL (ref 0–0.1)
BASOPHILS NFR BLD AUTO: 0.4 %
EOSINOPHIL # BLD AUTO: 0.12 X 10*3/UL (ref 0.04–0.35)
EOSINOPHIL NFR BLD AUTO: 1.6 %
ERYTHROCYTE [DISTWIDTH] IN BLOOD BY AUTOMATED COUNT: 4.04 X 10*6/UL (ref 4.1–5.2)
ERYTHROCYTE [DISTWIDTH] IN BLOOD: 13.5 % (ref 11.5–14.5)
HCT VFR BLD AUTO: 36.6 % (ref 37.2–46.3)
HGB BLD-MCNC: 11.6 G/DL (ref 12–15)
IMM GRANULOCYTES BLD QL AUTO: 0.3 %
LYMPHOCYTES # SPEC AUTO: 1.21 X 10*3/UL (ref 0.9–5)
LYMPHOCYTES NFR SPEC AUTO: 16.4 %
MCH RBC QN AUTO: 28.7 PG (ref 27–32)
MCHC RBC AUTO-ENTMCNC: 31.7 G/DL (ref 32–37)
MCV RBC AUTO: 90.6 FL (ref 80–97)
MONOCYTES # BLD AUTO: 0.87 X 10*3/UL (ref 0.2–1)
MONOCYTES NFR BLD AUTO: 11.8 %
NEUTROPHILS # BLD AUTO: 5.13 X 10*3/UL (ref 1.8–7.7)
NEUTROPHILS NFR BLD AUTO: 69.5 %
NRBC BLD AUTO-RTO: 0 /100 WBCS (ref 0–0)
PLATELET # BLD AUTO: 193 X 10*3/UL (ref 140–440)
WBC # BLD AUTO: 7.38 X 10*3/UL (ref 4.5–10)

## 2022-09-21 RX ADMIN — HEPARIN SODIUM SCH UNIT: 5000 INJECTION INTRAVENOUS; SUBCUTANEOUS at 17:44

## 2022-09-21 RX ADMIN — PREGABALIN SCH MG: 75 CAPSULE ORAL at 17:44

## 2022-09-21 RX ADMIN — CEFAZOLIN SCH: 330 INJECTION, POWDER, FOR SOLUTION INTRAMUSCULAR; INTRAVENOUS at 12:45

## 2022-09-21 RX ADMIN — HEPARIN SODIUM SCH UNIT: 5000 INJECTION INTRAVENOUS; SUBCUTANEOUS at 00:06

## 2022-09-21 RX ADMIN — Medication SCH MCG: at 17:44

## 2022-09-21 RX ADMIN — ACETAMINOPHEN PRN MG: 325 TABLET, FILM COATED ORAL at 17:51

## 2022-09-21 RX ADMIN — PREGABALIN SCH MG: 75 CAPSULE ORAL at 10:00

## 2022-09-21 RX ADMIN — CEFAZOLIN SCH MLS/HR: 330 INJECTION, POWDER, FOR SOLUTION INTRAMUSCULAR; INTRAVENOUS at 13:23

## 2022-09-21 RX ADMIN — SERTRALINE HYDROCHLORIDE SCH MG: 100 TABLET ORAL at 10:01

## 2022-09-21 RX ADMIN — Medication SCH MCG: at 21:17

## 2022-09-21 RX ADMIN — ASPIRIN 325 MG ORAL TABLET SCH MG: 325 PILL ORAL at 10:01

## 2022-09-21 RX ADMIN — MORPHINE SULFATE PRN MG: 4 INJECTION, SOLUTION INTRAMUSCULAR; INTRAVENOUS at 04:14

## 2022-09-21 RX ADMIN — Medication SCH MCG: at 10:01

## 2022-09-21 RX ADMIN — MORPHINE SULFATE PRN MG: 4 INJECTION, SOLUTION INTRAMUSCULAR; INTRAVENOUS at 14:42

## 2022-09-21 RX ADMIN — MORPHINE SULFATE PRN MG: 4 INJECTION, SOLUTION INTRAMUSCULAR; INTRAVENOUS at 09:13

## 2022-09-21 RX ADMIN — HEPARIN SODIUM SCH UNIT: 5000 INJECTION INTRAVENOUS; SUBCUTANEOUS at 10:00

## 2022-09-21 RX ADMIN — ASPIRIN 325 MG ORAL TABLET SCH MG: 325 PILL ORAL at 21:17

## 2022-09-21 RX ADMIN — PREGABALIN SCH MG: 75 CAPSULE ORAL at 21:17

## 2022-09-21 RX ADMIN — CEFAZOLIN SCH: 330 INJECTION, POWDER, FOR SOLUTION INTRAMUSCULAR; INTRAVENOUS at 04:41

## 2022-09-21 RX ADMIN — PANTOPRAZOLE SODIUM SCH MG: 40 TABLET, DELAYED RELEASE ORAL at 10:02

## 2022-09-21 NOTE — P.PN
Subjective


Progress Note Date: 09/21/22


Principal diagnosis: 


Recurrent dislocation right hip.


Status post revision total right hip arthroplasty.





This is a 67-year-old female that had a right total arthroplasty performed 

approximately 2 years ago.  Approximately 6-8 weeks ago she experienced a 

dislocation while at home.  She had multiple attempts at closed reduction, until

finally an open reduction was performed.  Her hip was found to be stable at that

time.  She was doing well postoperatively and then experienced a second 

dislocation again while at home.  After discussing the surgical nonsurgical 

treatment options with her and her  at length, It is recommended she 

undergo a a revision of a right total hip arthroplasty with conversion to a dual

mobility liner for stability.  A wound VAC was also placed to assist with 

drainage and wound closure.





Today is postoperative day #1 status post revision total right hip arthroplasty 

with application of Prevena wound VAC.  There are no new complaints or concerns 

today.  Vital signs are stable.








Objective





- Vital Signs


Vital signs: 


                                   Vital Signs











Temp  98.9 F   09/21/22 07:46


 


Pulse  90   09/21/22 07:46


 


Resp  16   09/21/22 07:46


 


BP  108/67   09/21/22 07:46


 


Pulse Ox  93 L  09/21/22 07:46


 


FiO2      








                                 Intake & Output











 09/20/22 09/21/22 09/21/22





 18:59 06:59 18:59


 


Intake Total 1101 500 


 


Output Total 1050 800 


 


Balance 51 -300 


 


Weight 81.647 kg 81.647 kg 


 


Intake:   


 


  IV 1101  


 


  Oral  500 


 


Output:   


 


  Urine 950 800 


 


  Estimated Blood Loss 100  


 


Other:   


 


  Voiding Method Indwelling Catheter Indwelling Catheter 














- Exam


This is a pleasant 67-year-old female in no acute distress.  She is alert and 

oriented 3.  Exam of the right hip reveals that the wound VAC is in place and 

working properly.  There is minimal erythema to the surrounding area.  She has 

full foot and ankle motion without difficulty or pain.  Neurovascular status to 

the lower extremity is intact.








- Labs


CBC & Chem 7: 


                                 09/18/22 12:20





                                 09/18/22 12:20





Assessment and Plan


(1) Status post revision of total hip


Current Visit: Yes   Status: Acute   Code(s): Z96.649 - PRESENCE OF UNSPECIFIED 

ARTIFICIAL HIP JOINT   SNOMED Code(s): 139461615


   





(2) Recurrent dislocation, right hip


Current Visit: Yes   Status: Acute   Code(s): M24.451 - RECURRENT DISLOCATION, 

RIGHT HIP   SNOMED Code(s): 866563987


   


Plan: 


The clinical findings are discussed with the patient.  We will begin physical 

therapy today.  We are planning discharge to home versus rehab tomorrow or 

Friday.

## 2022-09-21 NOTE — P.PN
Subjective


Progress Note Date: 09/21/22





Patient denying any acute complaints.  She stated her pain is controlled with 

the pain meds.  I encouraged the patient used incentive spirometer..





Objective





- Vital Signs


Vital signs: 


                                   Vital Signs











Temp  98.8 F   09/21/22 12:35


 


Pulse  89   09/21/22 12:35


 


Resp  17   09/21/22 12:35


 


BP  117/70   09/21/22 12:35


 


Pulse Ox  95   09/21/22 12:35


 


FiO2      








                                 Intake & Output











 09/20/22 09/21/22 09/21/22





 18:59 06:59 18:59


 


Intake Total 1101 500 


 


Output Total 1050 800 200


 


Balance 51 -300 -200


 


Weight 81.647 kg 81.647 kg 


 


Intake:   


 


  IV 1101  


 


  Oral  500 


 


Output:   


 


  Urine 950 800 200


 


    Uretheral (Cont[place]ras)   200


 


  Estimated Blood Loss 100  


 


Other:   


 


  Voiding Method Indwelling Catheter Indwelling Catheter Indwelling Catheter














- Exam





General examination - Alert  and Oriented 3 in NAD


Heart - + S1S2  no murmurs


Lungs - Clear to auscultation


Abdomen soft NT ND +ve BS, obese


Extremities - No edema, right hip wound VAC


CNS - Moving all 4 extremities spontaneously


Psych - Calm and cooperative





- Labs


CBC & Chem 7: 


                                 09/21/22 04:44





                                 09/18/22 12:20


Labs: 


                  Abnormal Lab Results - Last 24 Hours (Table)











  09/21/22 Range/Units





  04:44 


 


RBC  4.04 L  (4.10-5.20)  X 10*6/uL


 


Hgb  11.6 L  (12.0-15.0)  g/dL


 


Hct  36.6 L  (37.2-46.3)  %


 


MCHC  31.7 L  (32.0-37.0)  g/dL














Assessment and Plan


Assessment: 





Right hip dislocation


Status post right hip repair





Hypertension


Resume lisinopril





Depression


Resume sertraline





Rheumatoid arthritis


Hold Leflunomide similar patient can heal from her surgery





Neuropathic pain


lyrica





GERD


Resume Nexium





Obstructive sleep apnea


CPAP at bedtime





DVT prophylaxis: Heparin

## 2022-09-22 LAB
PH UR: 5.5 [PH] (ref 5–8)
SP GR UR: 1.01 (ref 1–1.03)
UROBILINOGEN UR QL STRIP: <2 MG/DL (ref ?–2)

## 2022-09-22 RX ADMIN — CEFAZOLIN SCH: 330 INJECTION, POWDER, FOR SOLUTION INTRAMUSCULAR; INTRAVENOUS at 16:46

## 2022-09-22 RX ADMIN — ASPIRIN 325 MG ORAL TABLET SCH MG: 325 PILL ORAL at 20:36

## 2022-09-22 RX ADMIN — PREGABALIN SCH MG: 75 CAPSULE ORAL at 16:45

## 2022-09-22 RX ADMIN — HEPARIN SODIUM SCH UNIT: 5000 INJECTION INTRAVENOUS; SUBCUTANEOUS at 16:45

## 2022-09-22 RX ADMIN — PREGABALIN SCH MG: 75 CAPSULE ORAL at 08:15

## 2022-09-22 RX ADMIN — SERTRALINE HYDROCHLORIDE SCH MG: 100 TABLET ORAL at 08:15

## 2022-09-22 RX ADMIN — PANTOPRAZOLE SODIUM SCH MG: 40 TABLET, DELAYED RELEASE ORAL at 04:52

## 2022-09-22 RX ADMIN — HEPARIN SODIUM SCH UNIT: 5000 INJECTION INTRAVENOUS; SUBCUTANEOUS at 08:15

## 2022-09-22 RX ADMIN — Medication SCH MCG: at 16:46

## 2022-09-22 RX ADMIN — DOXYCYCLINE SCH MG: 100 CAPSULE ORAL at 20:37

## 2022-09-22 RX ADMIN — HEPARIN SODIUM SCH UNIT: 5000 INJECTION INTRAVENOUS; SUBCUTANEOUS at 00:05

## 2022-09-22 RX ADMIN — PREGABALIN SCH MG: 75 CAPSULE ORAL at 20:36

## 2022-09-22 RX ADMIN — DOXYCYCLINE SCH MG: 100 CAPSULE ORAL at 15:13

## 2022-09-22 RX ADMIN — Medication SCH MCG: at 20:36

## 2022-09-22 RX ADMIN — ASPIRIN 325 MG ORAL TABLET SCH MG: 325 PILL ORAL at 08:15

## 2022-09-22 RX ADMIN — CEFAZOLIN SCH: 330 INJECTION, POWDER, FOR SOLUTION INTRAMUSCULAR; INTRAVENOUS at 04:52

## 2022-09-22 RX ADMIN — Medication SCH MCG: at 08:15

## 2022-09-22 NOTE — P.PN
Subjective


Progress Note Date: 09/22/22





Patient had a one-time isolated temperature of 100.3 last night.  Patient states

that she does have a cough which is not unusual for her.  She denies any 

dysuria.  Patient had a chest x-ray that was negative for pneumonia.  UA is 

pending.  Patient does report some pus draining from the site where she had her 

IV line.  Patient is worried that this may become a worsening infection.





Objective





- Vital Signs


Vital signs: 


                                   Vital Signs











Temp  97.6 F   09/22/22 12:48


 


Pulse  92   09/22/22 12:48


 


Resp  19   09/22/22 12:48


 


BP  106/65   09/22/22 12:48


 


Pulse Ox  93 L  09/22/22 12:48


 


FiO2      








                                 Intake & Output











 09/21/22 09/22/22 09/22/22





 18:59 06:59 18:59


 


Intake Total 600 590 


 


Output Total 200  


 


Balance 400 590 


 


Intake:   


 


  Oral 600 590 


 


Output:   


 


  Urine 200  


 


    Uretheral (Cont[place]ras) 200  


 


Other:   


 


  Voiding Method Indwelling Catheter Indwelling Catheter Indwelling Catheter


 


  # Voids 2 10 














- Exam





General examination - Alert  and Oriented 3 in NAD


Heart - + S1S2  no murmurs


Lungs - Clear to auscultation


Abdomen soft NT ND +ve BS, obese


Extremities - No edema, right hip wound VAC, in the left antecubital area there 

is a small abscess that is draining


CNS - Moving all 4 extremities spontaneously


Psych - Calm and cooperative





- Labs


CBC & Chem 7: 


                                 09/21/22 04:44





                                 09/18/22 12:20





Assessment and Plan


Assessment: 





Right hip dislocation


Status post right hip repair





Fever likely due to atelectasis


Patient does have a small skin abscess at the site of her IV line that has been 

removed however this is not severe enough to cause a 


Patient's chest x-ray negative for pneumonia


UA is pending


Blood cultures obtained


Patient does not meet sepsis criteria





Small skin abscess at the site of the IV line


IV line removal


We'll start the patient and doxycycline





Hypertension


Resume lisinopril





Depression


Resume sertraline





Rheumatoid arthritis


Hold Leflunomide similar patient can heal from her surgery





Neuropathic pain


lyrica





GERD


Resume Nexium





Obstructive sleep apnea


CPAP at bedtime





DVT prophylaxis: Heparin





No fevers in the next 24 hours patient can be discharged to a skilled nursing 

facility

## 2022-09-22 NOTE — P.PN
Subjective


Progress Note Date: 09/22/22


This is a 67-year-old female is status post revision right total hip 

arthroplasty with conversion to a dual mobility liner. This is postoperative day

#2 and patient is seen and evaluated at bedside today.  Patient states that her 

pain is well controlled and she denies any new complaints today. Patient states 

that she has been up and walking with physical therapy today. 








Objective





- Vital Signs


Vital signs: 


                                   Vital Signs











Temp  97.6 F   09/22/22 12:48


 


Pulse  92   09/22/22 12:48


 


Resp  19   09/22/22 12:48


 


BP  106/65   09/22/22 12:48


 


Pulse Ox  93 L  09/22/22 12:48


 


FiO2      








                                 Intake & Output











 09/21/22 09/22/22 09/22/22





 18:59 06:59 18:59


 


Intake Total 600 590 


 


Output Total 200  


 


Balance 400 590 


 


Intake:   


 


  Oral 600 590 


 


Output:   


 


  Urine 200  


 


    Uretheral (Cont[place]ras) 200  


 


Other:   


 


  Voiding Method Indwelling Catheter Indwelling Catheter Indwelling Catheter


 


  # Voids 2 10 














- Exam








On exam patient is lying comfortably in bed in no acute distress. Patient is 

alert and oriented x3. Prevena wound VAC is clean, dry and intact. Calf is soft 

and nontender to palpation. Sensation intact. Patient has full range of motion 

of the foot and ankle. Neurovascular status and circulatory status are intact. 





- Labs


CBC & Chem 7: 


                                 09/21/22 04:44





                                 09/18/22 12:20





Assessment and Plan


(1) Recurrent dislocation, right hip


Current Visit: Yes   Status: Acute   Code(s): M24.451 - RECURRENT DISLOCATION, 

RIGHT HIP   SNOMED Code(s): 823590548


   





(2) History of total right hip arthroplasty


Current Visit: Yes   Status: Acute   Code(s): Z96.641 - PRESENCE OF RIGHT 

ARTIFICIAL HIP JOINT   SNOMED Code(s): 820832338636


   


Plan: 


1. Weightbearing as tolerated with a walker.


2.  Aspirin for DVT prophylaxis.


3.  Continue physical therapy.


4.  Appreciate input from internal medicine.


5. Wound vac to stay in place for 7 days. 


6. Anticipate discharge to [facility] in the next 24-48 hours.

## 2022-09-22 NOTE — XR
EXAMINATION TYPE: XR chest 1V portable

 

DATE OF EXAM: 9/22/2022 10:16 AM

 

COMPARISON: None

 

TECHNIQUE: XR chest 1V portable Portable AP radiograph of the chest.

 

CLINICAL INDICATION:Female, 67 years old with history of fever; 

 

FINDINGS: 

Lungs/Pleura: There is no evidence of pleural effusion, focal consolidation, or pneumothorax.  

Pulmonary vascularity: Unremarkable.

Heart/mediastinum: Cardiomediastinal silhouette is unremarkable.

Musculoskeletal: No acute osseous pathology.

 

 

IMPRESSION: 

No acute cardiopulmonary disease/process.

## 2022-09-23 VITALS
DIASTOLIC BLOOD PRESSURE: 65 MMHG | HEART RATE: 78 BPM | RESPIRATION RATE: 18 BRPM | SYSTOLIC BLOOD PRESSURE: 106 MMHG | TEMPERATURE: 97.9 F

## 2022-09-23 LAB
BASOPHILS # BLD AUTO: 0.05 X 10*3/UL (ref 0–0.1)
BASOPHILS NFR BLD AUTO: 0.7 %
EOSINOPHIL # BLD AUTO: 0.33 X 10*3/UL (ref 0.04–0.35)
EOSINOPHIL NFR BLD AUTO: 4.7 %
ERYTHROCYTE [DISTWIDTH] IN BLOOD BY AUTOMATED COUNT: 3.78 X 10*6/UL (ref 4.1–5.2)
ERYTHROCYTE [DISTWIDTH] IN BLOOD: 13.3 % (ref 11.5–14.5)
HCT VFR BLD AUTO: 33.3 % (ref 37.2–46.3)
HGB BLD-MCNC: 10.7 G/DL (ref 12–15)
IMM GRANULOCYTES BLD QL AUTO: 0.3 %
LYMPHOCYTES # SPEC AUTO: 1.37 X 10*3/UL (ref 0.9–5)
LYMPHOCYTES NFR SPEC AUTO: 19.6 %
MCH RBC QN AUTO: 28.3 PG (ref 27–32)
MCHC RBC AUTO-ENTMCNC: 32.1 G/DL (ref 32–37)
MCV RBC AUTO: 88.1 FL (ref 80–97)
MONOCYTES # BLD AUTO: 0.81 X 10*3/UL (ref 0.2–1)
MONOCYTES NFR BLD AUTO: 11.6 %
NEUTROPHILS # BLD AUTO: 4.42 X 10*3/UL (ref 1.8–7.7)
NEUTROPHILS NFR BLD AUTO: 63.1 %
NRBC BLD AUTO-RTO: 0 /100 WBCS (ref 0–0)
PLATELET # BLD AUTO: 183 X 10*3/UL (ref 140–440)
WBC # BLD AUTO: 7 X 10*3/UL (ref 4.5–10)

## 2022-09-23 RX ADMIN — SERTRALINE HYDROCHLORIDE SCH MG: 100 TABLET ORAL at 10:29

## 2022-09-23 RX ADMIN — ACETAMINOPHEN PRN MG: 325 TABLET, FILM COATED ORAL at 12:33

## 2022-09-23 RX ADMIN — PANTOPRAZOLE SODIUM SCH MG: 40 TABLET, DELAYED RELEASE ORAL at 10:29

## 2022-09-23 RX ADMIN — DOXYCYCLINE SCH MG: 100 CAPSULE ORAL at 11:07

## 2022-09-23 RX ADMIN — Medication SCH MCG: at 10:29

## 2022-09-23 RX ADMIN — CEFAZOLIN SCH: 330 INJECTION, POWDER, FOR SOLUTION INTRAMUSCULAR; INTRAVENOUS at 05:14

## 2022-09-23 RX ADMIN — ASPIRIN 325 MG ORAL TABLET SCH MG: 325 PILL ORAL at 10:30

## 2022-09-23 RX ADMIN — ACETAMINOPHEN PRN MG: 325 TABLET, FILM COATED ORAL at 08:00

## 2022-09-23 RX ADMIN — CEFAZOLIN SCH: 330 INJECTION, POWDER, FOR SOLUTION INTRAMUSCULAR; INTRAVENOUS at 02:08

## 2022-09-23 RX ADMIN — PREGABALIN SCH MG: 75 CAPSULE ORAL at 10:29

## 2022-09-23 RX ADMIN — HEPARIN SODIUM SCH: 5000 INJECTION INTRAVENOUS; SUBCUTANEOUS at 02:08

## 2022-09-23 RX ADMIN — HEPARIN SODIUM SCH UNIT: 5000 INJECTION INTRAVENOUS; SUBCUTANEOUS at 10:31

## 2022-09-23 NOTE — P.DS
Providers


Date of admission: 


09/18/22 03:58





Expected date of discharge: 09/23/22


Attending physician: 


Miki Bucio





Consults: 





                                        





09/18/22 06:31


Consult Physician Routine 


   Consulting Provider: Aimee Armstrong


   Consult Reason/Comments: med manage


   Do you want consulting provider notified?: Yes











Primary care physician: 


Tracy Linn








- Discharge Diagnosis(es)


(1) Recurrent dislocation, right hip


Current Visit: Yes   Status: Acute   





(2) History of total right hip arthroplasty


Current Visit: Yes   Status: Acute   


Hospital Course: 


This is an 67-year-old female with a history of recurrent dislocation of her 

right total hip.  The patient presented for evaluation in the emergency room on 

09/18/2022.  After discussion and consideration patient elects to proceed with 

revision right total arthroplasty with conversion to a dual mobility liner for 

stability.  The patient is seen preoperatively by Dr. Bucio and medically 

cleared for surgery by internal medicine.





Patient is admitted to [facility] on 09/18/2022 and revision right total

arthroplasty with conversion to a dual mobility liner for stability is performed

on 09/20/2022.  The procedure is performed without complication or sequelae.  

The patient is doing well postoperatively.  Labs and vital signs are stable on 

day of discharge.  The patient did have a fall while admitted. A CT of the 

facial bones was negative for fracture. 





On day of discharge patient's wound VAC is clean, dry and intact. There is no 

drainage noted at this time.  There is minimal soft tissue swelling to the hip 

and thigh.  Patient has full foot and ankle motion without difficulty or pain.  

Calf is soft and nontender to palpation.  Neurovascular status to the right 

lower extremity is intact.  Patient is discharged to [facility] in good condition. 

Please see med rec for accurate list of home medications.





Patient Condition at Discharge: Fair





Plan - Discharge Summary


Discharge Rx Participant: No


New Discharge Prescriptions: 


New


   Aspirin 325 mg PO BID #60 tab


   Sennosides [Senokot] 2 tab PO DAILY PRN #60 tablet


     PRN Reason: Constipation


   traMADol HCl [Ultram] 1 - 2 tab PO Q6H PRN #32 tab


     PRN Reason: Pain


   Ondansetron Odt [Zofran Odt] 1 tab PO Q8HR PRN #10 tab


     PRN Reason: Nausea





No Action


   Fluticasone Nasal Spray [Flonase Nasal Spray] 1 spr EA NOSTRIL DAILY PRN


     PRN Reason: allergies


   Albuterol Inhaler [Ventolin Hfa Inhaler] 2 puff INHALATION RT-Q6H PRN


     PRN Reason: Shortness Of Breath


   Leflunomide [Arava] 20 mg PO Q48H


   Sertraline HCl [Zoloft] 100 mg PO DAILY


   Pregabalin [Lyrica] 150 mg PO TID


   Cholecalciferol [Vitamin D3 (25 Mcg = 1000 Iu)] 25 mcg PO TID


   lisinopriL [Zestril] 5 mg PO DAILY


   Esomeprazole Magnesium [NexIUM 24Hr] 20 mg PO DAILY


   Oxymetazoline 0.05% Nasl Spray [Afrin 0.05% Nasal Spray] 2 spr EA NOSTRIL BID

PRN


     PRN Reason: Allergy Symptoms


Discharge Medication List





Albuterol Inhaler [Ventolin Hfa Inhaler] 2 puff INHALATION RT-Q6H PRN 08/12/20 

[History]


Fluticasone Nasal Spray [Flonase Nasal Spray] 1 spr EA NOSTRIL DAILY PRN 

08/12/20 [History]


Leflunomide [Arava] 20 mg PO Q48H 08/12/20 [History]


Sertraline HCl [Zoloft] 100 mg PO DAILY 08/12/20 [History]


Cholecalciferol [Vitamin D3 (25 Mcg = 1000 Iu)] 25 mcg PO TID 08/02/22 [History]


Esomeprazole Magnesium [NexIUM 24Hr] 20 mg PO DAILY 08/02/22 [History]


Pregabalin [Lyrica] 150 mg PO TID 08/02/22 [History]


Oxymetazoline 0.05% Nasl Spray [Afrin 0.05% Nasal Spray] 2 spr EA NOSTRIL BID 

PRN 09/18/22 [History]


lisinopriL [Zestril] 5 mg PO DAILY 09/18/22 [History]


Aspirin 325 mg PO BID #60 tab 09/20/22 [Rx]


Ondansetron Odt [Zofran Odt] 1 tab PO Q8HR PRN #10 tab 09/20/22 [Rx]


Sennosides [Senokot] 2 tab PO DAILY PRN #60 tablet 09/20/22 [Rx]


traMADol HCl [Ultram] 1 - 2 tab PO Q6H PRN #32 tab 09/20/22 [Rx]








Follow up Appointment(s)/Referral(s): 


Tracy Linn DO [Primary Care Provider] - 1-2 days


Miki Bucio DO [Doctor of Osteopathic Medicine] - 09/28/22


Activity/Diet/Wound Care/Special Instructions: 


Weightbearing as tolerated with walker.


Leave wound vac in place for one week. 


Wound vac to be removed in the office. 


Please take aspirin 325mg twice daily for 30 days to prevent blood clots.


Recommend use of compression stockings daily until follow up to help prevent 

swelling and blood clots. May remove at night before sleeping. 


Please follow-up with [facility] in 1 week and call with any 

questions or concerns, [phone].


Discharge Disposition: TRANSFER TO SNF/ECF

## 2022-09-23 NOTE — CT
EXAMINATION TYPE: CT facial bones wo con

 

DATE OF EXAM: 9/23/2022

 

COMPARISON: None

 

HISTORY: Fall pain to nose

 

CT DLP: 513.30 mGycm

Automated exposure control for dose reduction was used.

 

Images obtained from the mandible to top of the frontal sinuses with no contrast.

 

The maxilla is intact. Visualized mandible is intact. Temporomandibular joints are intact. There is e
luis daniel normal aeration of the paranasal sinuses. There is some mild mucosal thickening left anterior et
hmoid sinus. Maxilla is intact and orbital margins are intact. No retro-orbital mass. Filters appears
 normal. Zygomatic arches appear normal. Maxillary spine is intact. Nasal bone appears intact.

 

IMPRESSION:

No fracture seen. Minimal left-sided anterior ethmoid sinusitis.

## 2022-09-23 NOTE — P.EN
patient sustained an accidental fall on her face, due to slipping on wet floor 

(had urinary incontinence accident and wet the floor and her clothes prior to 

the fall). no loss of consciousness. neuro exam non focal , vitals stable, 

patient alert oriented X3


patient not on blood thinners , except for DVT ppx with sc heparin 


CT face showed no acute fractures





fall precautions


neuro checks Q4 hr

## 2022-09-23 NOTE — P.PN
Subjective


Progress Note Date: 09/23/22





Patient had a fall last night.  Previously event note for further detail.  This 

morning patient is denying any acute complaints.  She denies any fever or 

chills.  She is had no more fevers in the past 24 hours.  Her UA was negative.  

Chest x-ray also negative.





Objective





- Vital Signs


Vital signs: 


                                   Vital Signs











Temp  97.9 F   09/23/22 11:08


 


Pulse  78   09/23/22 11:08


 


Resp  18   09/23/22 11:08


 


BP  106/65   09/23/22 11:08


 


Pulse Ox  93 L  09/23/22 11:08


 


FiO2      








                                 Intake & Output











 09/22/22 09/23/22 09/23/22





 18:59 06:59 18:59


 


Output Total   2


 


Balance   -2


 


Output:   


 


  Urine   1


 


  Stool   1


 


Other:   


 


  Voiding Method Indwelling Catheter Indwelling Catheter 


 


  # Voids 3  














- Exam





General examination - Alert  and Oriented 3 in NAD


Heart - + S1S2  no murmurs


Lungs - Clear to auscultation


Abdomen soft NT ND +ve BS, obese


Extremities - No edema, right hip wound VAC, in the left antecubital area there 

is a small abscess that is draining


CNS - Moving all 4 extremities spontaneously


Psych - Calm and cooperative





- Labs


CBC & Chem 7: 


                                 09/23/22 05:13





                                 09/18/22 12:20


Labs: 


                  Abnormal Lab Results - Last 24 Hours (Table)











  09/23/22 Range/Units





  05:13 


 


RBC  3.78 L  (4.10-5.20)  X 10*6/uL


 


Hgb  10.7 L  (12.0-15.0)  g/dL


 


Hct  33.3 L  (37.2-46.3)  %








                      Microbiology - Last 24 Hours (Table)











 09/22/22 10:17 Blood Culture - Preliminary





 Blood    No Growth after 24 hours














Assessment and Plan


Assessment: 





Right hip dislocation


Status post right hip repair





Fever likely due to atelectasis


Patient does have a small skin abscess at the site of her IV line that has been 

removed however this is not severe enough to cause a 


Patient's chest x-ray negative for pneumonia


UA is negative


Follow up on blood cultures


Patient does not meet sepsis criteria





Small skin abscess at the site of the IV line


IV line removal


Complete a short course of antibiotics with doxycycline





Hypertension


Resume lisinopril





Depression


Resume sertraline





Rheumatoid arthritis


Hold Leflunomide similar patient can heal from her surgery





Neuropathic pain


lyrica





GERD


Resume Nexium





Obstructive sleep apnea


CPAP at bedtime





DVT prophylaxis: Heparin





Patient stable for discharge to skilled nursing facility for medical standpoint